# Patient Record
Sex: MALE | Race: WHITE
[De-identification: names, ages, dates, MRNs, and addresses within clinical notes are randomized per-mention and may not be internally consistent; named-entity substitution may affect disease eponyms.]

---

## 2017-07-21 ENCOUNTER — HOSPITAL ENCOUNTER (EMERGENCY)
Dept: HOSPITAL 17 - NEPE | Age: 72
Discharge: HOME | End: 2017-07-21
Payer: MEDICARE

## 2017-07-21 VITALS
SYSTOLIC BLOOD PRESSURE: 137 MMHG | OXYGEN SATURATION: 97 % | DIASTOLIC BLOOD PRESSURE: 85 MMHG | TEMPERATURE: 97.6 F | RESPIRATION RATE: 18 BRPM | HEART RATE: 81 BPM

## 2017-07-21 VITALS — HEIGHT: 70 IN | WEIGHT: 249.12 LBS | BODY MASS INDEX: 35.66 KG/M2

## 2017-07-21 VITALS — OXYGEN SATURATION: 97 %

## 2017-07-21 DIAGNOSIS — K21.9: ICD-10-CM

## 2017-07-21 DIAGNOSIS — K80.20: ICD-10-CM

## 2017-07-21 DIAGNOSIS — E78.00: ICD-10-CM

## 2017-07-21 DIAGNOSIS — M10.9: ICD-10-CM

## 2017-07-21 DIAGNOSIS — I25.2: ICD-10-CM

## 2017-07-21 DIAGNOSIS — R11.0: ICD-10-CM

## 2017-07-21 DIAGNOSIS — I11.0: ICD-10-CM

## 2017-07-21 DIAGNOSIS — I48.91: ICD-10-CM

## 2017-07-21 DIAGNOSIS — I49.3: ICD-10-CM

## 2017-07-21 DIAGNOSIS — I50.9: ICD-10-CM

## 2017-07-21 DIAGNOSIS — R10.30: Primary | ICD-10-CM

## 2017-07-21 LAB
ALP SERPL-CCNC: 95 U/L (ref 45–117)
ALT SERPL-CCNC: 27 U/L (ref 12–78)
ANION GAP SERPL CALC-SCNC: 9 MEQ/L (ref 5–15)
AST SERPL-CCNC: 21 U/L (ref 15–37)
BASOPHILS # BLD AUTO: 0 TH/MM3 (ref 0–0.2)
BASOPHILS NFR BLD: 0.7 % (ref 0–2)
BILIRUB SERPL-MCNC: 0.8 MG/DL (ref 0.2–1)
BUN SERPL-MCNC: 9 MG/DL (ref 7–18)
CHLORIDE SERPL-SCNC: 100 MEQ/L (ref 98–107)
COLOR UR: YELLOW
COMMENT (UR): (no result)
CULTURE IF INDICATED: (no result)
EOSINOPHIL # BLD: 0.2 TH/MM3 (ref 0–0.4)
EOSINOPHIL NFR BLD: 3.2 % (ref 0–4)
ERYTHROCYTE [DISTWIDTH] IN BLOOD BY AUTOMATED COUNT: 14.3 % (ref 11.6–17.2)
GFR SERPLBLD BASED ON 1.73 SQ M-ARVRAT: 69 ML/MIN (ref 89–?)
GLUCOSE UR STRIP-MCNC: (no result) MG/DL
HCO3 BLD-SCNC: 27.1 MEQ/L (ref 21–32)
HCT VFR BLD CALC: 37.3 % (ref 39–51)
HEMO FLAGS: (no result)
HGB UR QL STRIP: (no result)
KETONES UR STRIP-MCNC: (no result) MG/DL
LYMPHOCYTES # BLD AUTO: 1.7 TH/MM3 (ref 1–4.8)
LYMPHOCYTES NFR BLD AUTO: 26.9 % (ref 9–44)
MCH RBC QN AUTO: 28.9 PG (ref 27–34)
MCHC RBC AUTO-ENTMCNC: 33.5 % (ref 32–36)
MCV RBC AUTO: 86.4 FL (ref 80–100)
MONOCYTES NFR BLD: 12.4 % (ref 0–8)
MUCOUS THREADS #/AREA URNS LPF: (no result) /LPF
NEUTROPHILS # BLD AUTO: 3.5 TH/MM3 (ref 1.8–7.7)
NEUTROPHILS NFR BLD AUTO: 56.8 % (ref 16–70)
NITRITE UR QL STRIP: (no result)
PLATELET # BLD: 151 TH/MM3 (ref 150–450)
POTASSIUM SERPL-SCNC: 3.3 MEQ/L (ref 3.5–5.1)
RBC # BLD AUTO: 4.32 MIL/MM3 (ref 4.5–5.9)
SODIUM SERPL-SCNC: 136 MEQ/L (ref 136–145)
SP GR UR STRIP: 1.01 (ref 1–1.03)
WBC # BLD AUTO: 6.2 TH/MM3 (ref 4–11)

## 2017-07-21 PROCEDURE — 99285 EMERGENCY DEPT VISIT HI MDM: CPT

## 2017-07-21 PROCEDURE — 85025 COMPLETE CBC W/AUTO DIFF WBC: CPT

## 2017-07-21 PROCEDURE — 93005 ELECTROCARDIOGRAM TRACING: CPT

## 2017-07-21 PROCEDURE — 74177 CT ABD & PELVIS W/CONTRAST: CPT

## 2017-07-21 PROCEDURE — 83690 ASSAY OF LIPASE: CPT

## 2017-07-21 PROCEDURE — 96374 THER/PROPH/DIAG INJ IV PUSH: CPT

## 2017-07-21 PROCEDURE — 96375 TX/PRO/DX INJ NEW DRUG ADDON: CPT

## 2017-07-21 PROCEDURE — 80053 COMPREHEN METABOLIC PANEL: CPT

## 2017-07-21 PROCEDURE — 81001 URINALYSIS AUTO W/SCOPE: CPT

## 2017-07-21 NOTE — PD
HPI


.


Nausea and abdominal pain


Chief Complaint:  Abdominal Pain


Time Seen by Provider:  11:45


Travel History


International Travel<30 days:  No


Contact w/Intl Traveler<30days:  No


Traveled to known affect area:  No





History of Present Illness


HPI


This patient presents with the acute onset of lower abdominal pain and nausea.  

Onset was today.  He is not vomiting.  He has had no known fever.  No diarrhea.

  No urinary symptoms.  He rates the pain 67/10.  No exacerbating or relieving 

factors.





The patient is also complaining of pain "across his shoulders."





PFSH


Past Medical History


Arthritis:  Yes


Asthma:  No


Atrial Fibrillation:  Yes


Blood Disorders:  No


Anxiety:  No


Depression:  Yes


Heart Rhythm Problems:  No


Cancer:  No


Cardiovascular Problems:  Yes (A-FIB / CHF / MI)


High Cholesterol:  Yes


Chemotherapy:  No


Chest Pain:  Yes


Congestive Heart Failure:  Yes


Cirrhosis:  Yes (PT DOES NOT KNOW WHETHER HE ACTUALLY HAS THIS)


COPD:  No


Cerebrovascular Accident:  Yes (25 years ago)


Coronary Artery Disease:  Yes


Diabetes:  No


Diminished Hearing:  No


Diverticulitis:  Yes


Endocrine:  Yes


Gastrointestinal Disorders:  Yes (Diverticulitis)


GERD:  Yes


Gout:  Yes


Genitourinary:  No


Headaches:  No


Hepatitis:  No


Hiatal Hernia:  No


Herniated Disk:  Yes


Hypertension:  Yes


Immune Disorder:  No


Implanted Vascular Access Dvce:  No


Kidney Stones:  No


Musculoskeletal:  Yes


Neurologic:  Yes (CVA)


Psychiatric:  Yes


Reproductive:  Yes (prostate issues -retention)


Respiratory:  Yes


Immunizations Current:  Yes


Migraines:  No


Myocardial Infarction:  Yes


Pancreatitis:  Yes


Radiation Therapy:  No


Renal Failure:  No


Seizures:  No


Sickle Cell Disease:  No


Sleep Apnea:  No


Thyroid Disease:  Yes


Ulcer:  No


Tetanus Vaccination:  < 5 Years


Influenza Vaccination:  Yes


PNEUMOCCOCAL Vaccine (Year):  2011





Past Surgical History


Abdominal Surgery:  Yes (RUPTURED SPLEEN)


Cardiac Surgery:  No


Ear Surgery:  No


Endocrine Surgery:  No


Eye Surgery:  No


Genitourinary Surgery:  No


Gynecologic Surgery:  No


Neurologic Surgery:  No


Oral Surgery:  No


Thoracic Surgery:  No


Other Surgery:  Yes (ruptured spleen )





Social History


Alcohol Use:  No


Tobacco Use:  No


Substance Use:  No





Allergies-Medications


(Allergen,Severity, Reaction):  


Coded Allergies:  


     Allopurinol (Verified  Allergy, Severe, 10/6/16)


     Naprosyn (Verified  Allergy, Severe, Itching, 10/6/16)


Reported Meds & Prescriptions





Reported Meds & Active Scripts


Active


Zofran Odt (Ondansetron Odt) 4 Mg Tab 4 Mg SL Q6HR PRN


Aspirin Low Strength (Aspirin) 81 Mg Chw 81 Mg PO DAILY 30 Days


Eliquis (Apixaban) 5 Mg Tab 5 Mg PO BID 30 Days


Lipitor 40 Mg Tab (Atorvastatin Calcium) 40 Mg Tab 40 Mg PO DAILY


Aspirin 325 Mg Tab (Aspirin) 325 Mg Tab 325 Mg PO DAILY


Zofran Tab (Ondansetron HCl) 4 Mg Tab 4 Mg PO Q6 PRN


Furosemide 40 Mg Tab 20 Mg PO DAILY


Reported


Stool Softener (Miscellaneous Medication) 100 Mg Tab 100 Mg PO DAILY PRN


Tamsulosin 0.4 mg (Tamsulosin HCl) 0.4 Mg Cap 0.4 Mg PO DAILY


Vitamin D-3 (Cholecalciferol) 2 000 Tab 2,000 Unit PO DAILY


Magnesium (Magnesium Oxide) 400 Mg Tab 800 Mg PO DAILY


Gabapentin 100 Mg Cap 300 Mg PO HS


Omeprazole 20 mg (Omeprazole) 20 Mg Tab 20 Mg PO DAILY


Multi-Vitamin Daily (Multivitamins) Daily Tab 1 Tab PO DAILY


Diovan 160 mg (Valsartan) 160 Mg Tab 160 Mg PO DAILY


Synthroid (Levothyroxine Sodium) 25 Mcg Tab 25 Mcg PO DAILY








Review of Systems


Except as stated in HPI:  all other systems reviewed are Neg


General / Constitutional:  No: Fever, Chills


Cardiovascular:  No: Chest Pain or Discomfort


Respiratory:  No: Shortness of Breath


Gastrointestinal:  Positive: Nausea, Abdominal Pain,  No: Vomiting, Diarrhea, 

Constipation


Genitourinary:  No: Urgency, Frequency, Dysuria


Musculoskeletal:  Positive: Pain (across his shoulders)





Physical Exam


Narrative


GENERAL: Patient is awake and alert and does not appear to be in any acute 

distress.


SKIN: Warm and dry.


HEAD: Atraumatic. Normocephalic. 


EYES: Pupils equal and round.  Extraocular movements are intact.


ENT: No nasal bleeding or discharge.  Mucous membranes pink and moist.


NECK: Trachea midline.  Neck is supple.


CARDIOVASCULAR: Irregularly irregular rate and rhythm.


RESPIRATORY: No accessory muscle use.  Lungs are clear.


GASTROINTESTINAL: Abdomen soft, non-tender, nondistended.  I am unable to 

elicit any tenderness.


MUSCULOSKELETAL: No obvious deformities.   No edema. 


NEUROLOGICAL: Awake and alert. No obvious cranial nerve deficits.  Motor 

grossly within normal limits. Normal speech.


PSYCHIATRIC: Appropriate mood and affect; insight and judgment normal.





Data


Data


Last Documented VS





Vital Signs








  Date Time  Temp Pulse Resp B/P Pulse Ox O2 Delivery O2 Flow Rate FiO2


 


7/21/17 11:50     97 Room Air  


 


7/21/17 11:43 97.6 81 18 137/85    








Orders





 Complete Blood Count With Diff (7/21/17 11:45)


Comprehensive Metabolic Panel (7/21/17 11:45)


Lipase (7/21/17 11:45)


Urinalysis - C+S If Indicated (7/21/17 11:45)


Ct Abd/Pel W Iv Contrast(Rout) (7/21/17 11:45)


Iv Access Insert/Monitor (7/21/17 11:45)


Ecg Monitoring (7/21/17 11:45)


Oximetry (7/21/17 11:45)


Morphine Inj (Morphine Inj) (7/21/17 11:45)


Ondansetron Inj (Zofran Inj) (7/21/17 11:45)


Sodium Chloride 0.9% Flush (Ns Flush) (7/21/17 11:45)


Electrocardiogram (7/21/17 11:45)


Iohexol 350 Inj (Omnipaque 350 Inj) (7/21/17 13:08)





Labs





 Laboratory Tests








Test 7/21/17 7/21/17





 11:50 13:20


 


White Blood Count 6.2 TH/MM3 


 


Red Blood Count 4.32 MIL/MM3 


 


Hemoglobin 12.5 GM/DL 


 


Hematocrit 37.3 % 


 


Mean Corpuscular Volume 86.4 FL 


 


Mean Corpuscular Hemoglobin 28.9 PG 


 


Mean Corpuscular Hemoglobin 33.5 % 





Concent  


 


Red Cell Distribution Width 14.3 % 


 


Platelet Count 151 TH/MM3 


 


Mean Platelet Volume 8.7 FL 


 


Neutrophils (%) (Auto) 56.8 % 


 


Lymphocytes (%) (Auto) 26.9 % 


 


Monocytes (%) (Auto) 12.4 % 


 


Eosinophils (%) (Auto) 3.2 % 


 


Basophils (%) (Auto) 0.7 % 


 


Neutrophils # (Auto) 3.5 TH/MM3 


 


Lymphocytes # (Auto) 1.7 TH/MM3 


 


Monocytes # (Auto) 0.8 TH/MM3 


 


Eosinophils # (Auto) 0.2 TH/MM3 


 


Basophils # (Auto) 0.0 TH/MM3 


 


CBC Comment DIFF FINAL  


 


Differential Comment   


 


Sodium Level 136 MEQ/L 


 


Potassium Level 3.3 MEQ/L 


 


Chloride Level 100 MEQ/L 


 


Carbon Dioxide Level 27.1 MEQ/L 


 


Anion Gap 9 MEQ/L 


 


Blood Urea Nitrogen 9 MG/DL 


 


Creatinine 1.05 MG/DL 


 


Estimat Glomerular Filtration 69 ML/MIN 





Rate  


 


Random Glucose 120 MG/DL 


 


Calcium Level 8.7 MG/DL 


 


Total Bilirubin 0.8 MG/DL 


 


Aspartate Amino Transf 21 U/L 





(AST/SGOT)  


 


Alanine Aminotransferase 27 U/L 





(ALT/SGPT)  


 


Alkaline Phosphatase 95 U/L 


 


Total Protein 8.1 GM/DL 


 


Albumin 3.7 GM/DL 


 


Lipase 99 U/L 


 


Urine Color  YELLOW 


 


Urine Turbidity  CLEAR 


 


Urine pH  5.5 


 


Urine Specific Gravity  1.015 


 


Urine Protein  NEG mg/dL


 


Urine Glucose (UA)  NEG mg/dL


 


Urine Ketones  NEG mg/dL


 


Urine Occult Blood  NEG 


 


Urine Nitrite  NEG 


 


Urine Bilirubin  NEG 


 


Urine Urobilinogen  LESS THAN 2.0





  MG/DL


 


Urine Leukocyte Esterase  NEG 


 


Urine WBC  LESS THAN 1





  /hpf


 


Urine Mucus  FEW /lpf


 


Microscopic Urinalysis Comment  CULT NOT





  INDICATED











MDM


Medical Decision Making


Medical Screen Exam Complete:  Yes


Emergency Medical Condition:  Yes


Medical Record Reviewed:  Yes (past medical history is significant for 

diverticulitis, hypertension, atrial fibrillation, CHF, hypothyroidism, 

previous stroke, previous alcohol abuse and previous suicidal ideation.)


Interpretation(s)


Atrial fibrillation with frequent PVCs.  Ventricular rate is 72.  There is no 

acute ST segment elevation or depression.


Differential Diagnosis


Differential diagnosis of abdominal pain includes but is not limited to 

gastritis, pancreatitis, hepatitis, gastroenteritis, gallbladder disease, 

constipation, urinary retention, UTI, peptic ulcer disease, diverticulitis or 

appendicitis


Narrative Course


This patient presents with nausea and lower abdominal pain.  His exam is benign.





CBC & BMP Diagram


7/21/17 11:50








LFTs and lipase are normal.





CT abd/pelvis:


1. No acute abnormality to explain the patient's pain.


2. Cholelithiasis.


3. Prior granulomatous disease.





UA is negative.





The history, exam, diagnostic testing, and current condition do not suggest any 

significant pathology to warrant further testing, continued ED treatment, 

admission, or surgical evaluation at this point.  The patient's condition is 

stable and appropriate for discharge.





Diagnosis





 Primary Impression:  


 Abdominal pain


 Qualified Code:  R10.30 - Lower abdominal pain


 Additional Impression:  


 Nausea


Patient Instructions:  Abdominal Pain (ED), General Instructions


Scripts


Ondansetron Odt (Zofran Odt)4 Mg Tab4 Mg SL Q6HR PRN (Nausea/Vomiting) #30 TAB  

Ref 0


   Prov:Renay Solomon MD         7/21/17


Disposition:  01 DISCHARGE HOME


Condition:  Stable








Renay Solomon MD Jul 21, 2017 12:46

## 2017-07-21 NOTE — EKG
Date Performed: 07/21/2017       Time Performed: 11:46:12

 

PTAGE:      72 years

 

EKG:      ATRIAL FIBRILLATION WITH ABERRANT CONDUCTION OR VENTRICULAR PREMATURE COMPLEXES LOW QRS VOL
TAGE IN PRECORDIAL LEADS ABNORMAL RHYTHM ECG

 

PREVIOUS TRACING       : 10/07/2016 05.26 Compared to the previous tracing PVCs are new

 

DOCTOR:   Eddie Franco  Interpretating Date/Time  07/21/2017 14:44:34

## 2017-07-21 NOTE — RADRPT
EXAM DATE/TIME:  07/21/2017 12:57 

 

[facility] COMPARISON:     

CT ABDOMEN & PELVIS W CONTRAST, July 09, 2016, 9:46.

 

 

INDICATIONS :     

Nausea, with Lower abdominal pain.

                     

 

IV CONTRAST:     

92 cc Omnipaque 350 (iohexol) IV 

 

 

ORAL CONTRAST:      

No oral contrast ingested.

                     

 

RADIATION DOSE:     

14.34 CTDIvol (mGy) 

 

 

MEDICAL HISTORY :     

Cardiovascular disease. Cerebrovascular disease.  Hypertension.CHF,CVA, ruptured spleen, prostate ret
ention.

 

SURGICAL HISTORY :       

 

ENCOUNTER:      

Initial

 

ACUITY:      

1 day

 

PAIN SCALE:      

5/10

 

LOCATION:        

lower quadrant abdominal.

 

TECHNIQUE:     

Volumetric scanning of the abdomen and pelvis was performed.  Using automated exposure control and ad
justment of the mA and/or kV according to patient size, radiation dose was kept as low as reasonably 
achievable to obtain optimal diagnostic quality images.  DICOM format image data is available electro
nically for review and comparison.  

 

FINDINGS:     

 

LOWER LUNGS:     

The visualized lower lungs are clear.

 

LIVER:     

Homogeneous density without lesion.  There is no dilation of the biliary tree. Several small calcifie
d gallstones are seen layering within an otherwise normal appearing gallbladder.

 

SPLEEN:     

Normal size without lesion. Multiple small granulomatous calcifications are seen involving the spleen


 

PANCREAS:     

Within normal limits.

 

KIDNEYS:     

Normal in size and shape.  There is no mass, stone or hydronephrosis.

 

ADRENAL GLANDS:     

Within normal limits.

 

VASCULAR:     

There is no aortic aneurysm.

 

BOWEL/MESENTERY:     

The stomach, small bowel, and colon demonstrate no acute abnormality.  There is no free intraperitone
al air or fluid. Multiple colonic diverticuli without acute inflammation. Appendix is normal by CT cr
iteria.

 

ABDOMINAL WALL:     

Within normal limits.

 

RETROPERITONEUM:     

There is no lymphadenopathy. 

 

BLADDER:     

No wall thickening or mass. 

 

REPRODUCTIVE:     

Within normal limits.

 

INGUINAL:     

There is no lymphadenopathy or hernia. 

 

MUSCULOSKELETAL:     

Within normal limits for patient age. 

 

CONCLUSION:     

1. No acute abnormality to explain the patient's pain.

2. Cholelithiasis.

3. Prior granulomatous disease.

 

 

 

 Cortez Luna Jr., MD on July 21, 2017 at 13:17           

Board Certified Radiologist.

 This report was verified electronically.

## 2018-06-12 ENCOUNTER — HOSPITAL ENCOUNTER (OUTPATIENT)
Dept: HOSPITAL 17 - NEPD | Age: 73
Setting detail: OBSERVATION
LOS: 1 days | Discharge: HOME | End: 2018-06-13
Attending: HOSPITALIST | Admitting: HOSPITALIST
Payer: OTHER GOVERNMENT

## 2018-06-12 VITALS
SYSTOLIC BLOOD PRESSURE: 146 MMHG | DIASTOLIC BLOOD PRESSURE: 76 MMHG | OXYGEN SATURATION: 96 % | TEMPERATURE: 98.3 F | RESPIRATION RATE: 20 BRPM | HEART RATE: 80 BPM

## 2018-06-12 VITALS — BODY MASS INDEX: 33.41 KG/M2 | HEIGHT: 68 IN | WEIGHT: 220.46 LBS

## 2018-06-12 VITALS
TEMPERATURE: 98.2 F | SYSTOLIC BLOOD PRESSURE: 117 MMHG | HEART RATE: 73 BPM | RESPIRATION RATE: 17 BRPM | DIASTOLIC BLOOD PRESSURE: 59 MMHG | OXYGEN SATURATION: 98 %

## 2018-06-12 VITALS
SYSTOLIC BLOOD PRESSURE: 146 MMHG | TEMPERATURE: 98.3 F | OXYGEN SATURATION: 96 % | HEART RATE: 80 BPM | RESPIRATION RATE: 20 BRPM | DIASTOLIC BLOOD PRESSURE: 76 MMHG

## 2018-06-12 VITALS
DIASTOLIC BLOOD PRESSURE: 68 MMHG | OXYGEN SATURATION: 96 % | SYSTOLIC BLOOD PRESSURE: 159 MMHG | TEMPERATURE: 98 F | HEART RATE: 92 BPM | RESPIRATION RATE: 19 BRPM

## 2018-06-12 VITALS — OXYGEN SATURATION: 96 %

## 2018-06-12 DIAGNOSIS — Z86.73: ICD-10-CM

## 2018-06-12 DIAGNOSIS — F10.129: ICD-10-CM

## 2018-06-12 DIAGNOSIS — K74.60: ICD-10-CM

## 2018-06-12 DIAGNOSIS — M19.90: ICD-10-CM

## 2018-06-12 DIAGNOSIS — K21.9: ICD-10-CM

## 2018-06-12 DIAGNOSIS — K57.92: ICD-10-CM

## 2018-06-12 DIAGNOSIS — Z79.01: ICD-10-CM

## 2018-06-12 DIAGNOSIS — I25.10: ICD-10-CM

## 2018-06-12 DIAGNOSIS — E78.00: ICD-10-CM

## 2018-06-12 DIAGNOSIS — M10.9: ICD-10-CM

## 2018-06-12 DIAGNOSIS — E78.5: ICD-10-CM

## 2018-06-12 DIAGNOSIS — R07.89: ICD-10-CM

## 2018-06-12 DIAGNOSIS — I11.0: ICD-10-CM

## 2018-06-12 DIAGNOSIS — F41.9: ICD-10-CM
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
## 2018-06-12 DIAGNOSIS — E07.9: ICD-10-CM

## 2018-06-12 DIAGNOSIS — R06.02: ICD-10-CM

## 2018-06-12 DIAGNOSIS — K86.1: ICD-10-CM

## 2018-06-12 DIAGNOSIS — I50.9: ICD-10-CM

## 2018-06-12 DIAGNOSIS — R07.9: Primary | ICD-10-CM

## 2018-06-12 DIAGNOSIS — I25.2: ICD-10-CM

## 2018-06-12 DIAGNOSIS — I48.91: ICD-10-CM

## 2018-06-12 DIAGNOSIS — R00.2: ICD-10-CM

## 2018-06-12 DIAGNOSIS — R94.31: ICD-10-CM

## 2018-06-12 DIAGNOSIS — N40.0: ICD-10-CM

## 2018-06-12 LAB
ALBUMIN SERPL-MCNC: 4 GM/DL (ref 3.4–5)
ALP SERPL-CCNC: 78 U/L (ref 45–117)
ALT SERPL-CCNC: 34 U/L (ref 12–78)
APAP SERPL-MCNC: (no result) MCG/ML (ref 10–30)
AST SERPL-CCNC: 33 U/L (ref 15–37)
BASOPHILS # BLD AUTO: 0.1 TH/MM3 (ref 0–0.2)
BASOPHILS NFR BLD: 1 % (ref 0–2)
BILIRUB SERPL-MCNC: 0.7 MG/DL (ref 0.2–1)
BUN SERPL-MCNC: 12 MG/DL (ref 7–18)
CALCIUM SERPL-MCNC: 8.7 MG/DL (ref 8.5–10.1)
CHLORIDE SERPL-SCNC: 105 MEQ/L (ref 98–107)
COLOR UR: YELLOW
CREAT SERPL-MCNC: 1.11 MG/DL (ref 0.6–1.3)
EOSINOPHIL # BLD: 0.1 TH/MM3 (ref 0–0.4)
EOSINOPHIL NFR BLD: 2.2 % (ref 0–4)
ERYTHROCYTE [DISTWIDTH] IN BLOOD BY AUTOMATED COUNT: 13.9 % (ref 11.6–17.2)
GFR SERPLBLD BASED ON 1.73 SQ M-ARVRAT: 65 ML/MIN (ref 89–?)
GLUCOSE SERPL-MCNC: 84 MG/DL (ref 74–106)
GLUCOSE UR STRIP-MCNC: (no result) MG/DL
HCO3 BLD-SCNC: 21.8 MEQ/L (ref 21–32)
HCT VFR BLD CALC: 41.7 % (ref 39–51)
HGB BLD-MCNC: 14.4 GM/DL (ref 13–17)
HGB UR QL STRIP: (no result)
HYALINE CASTS #/AREA URNS LPF: 2 /LPF
INR PPP: 2.3 RATIO
KETONES UR STRIP-MCNC: 10 MG/DL
LYMPHOCYTES # BLD AUTO: 2.3 TH/MM3 (ref 1–4.8)
LYMPHOCYTES NFR BLD AUTO: 37 % (ref 9–44)
MAGNESIUM SERPL-MCNC: 2 MG/DL (ref 1.5–2.5)
MCH RBC QN AUTO: 30.9 PG (ref 27–34)
MCHC RBC AUTO-ENTMCNC: 34.6 % (ref 32–36)
MCV RBC AUTO: 89.3 FL (ref 80–100)
MONOCYTE #: 0.4 TH/MM3 (ref 0–0.9)
MONOCYTES NFR BLD: 6 % (ref 0–8)
MUCOUS THREADS #/AREA URNS LPF: (no result) /LPF
NEUTROPHILS # BLD AUTO: 3.4 TH/MM3 (ref 1.8–7.7)
NEUTROPHILS NFR BLD AUTO: 53.8 % (ref 16–70)
NITRITE UR QL STRIP: (no result)
PLATELET # BLD: 185 TH/MM3 (ref 150–450)
PMV BLD AUTO: 8.8 FL (ref 7–11)
PROT SERPL-MCNC: 8.3 GM/DL (ref 6.4–8.2)
PROTHROMBIN TIME: 22.8 SEC (ref 9.8–11.6)
RBC # BLD AUTO: 4.67 MIL/MM3 (ref 4.5–5.9)
SODIUM SERPL-SCNC: 141 MEQ/L (ref 136–145)
SP GR UR STRIP: 1.02 (ref 1–1.03)
SQUAMOUS #/AREA URNS HPF: <1 /HPF (ref 0–5)
T4 FREE SERPL-MCNC: 1.19 NG/DL (ref 0.76–1.46)
TROPONIN I SERPL-MCNC: 0.04 NG/ML (ref 0.02–0.05)
TROPONIN I SERPL-MCNC: 0.04 NG/ML (ref 0.02–0.05)
URINE LEUKOCYTE ESTERASE: (no result)
WBC # BLD AUTO: 6.3 TH/MM3 (ref 4–11)

## 2018-06-12 PROCEDURE — 80053 COMPREHEN METABOLIC PANEL: CPT

## 2018-06-12 PROCEDURE — 85730 THROMBOPLASTIN TIME PARTIAL: CPT

## 2018-06-12 PROCEDURE — 80061 LIPID PANEL: CPT

## 2018-06-12 PROCEDURE — 82552 ASSAY OF CPK IN BLOOD: CPT

## 2018-06-12 PROCEDURE — 84484 ASSAY OF TROPONIN QUANT: CPT

## 2018-06-12 PROCEDURE — 93005 ELECTROCARDIOGRAM TRACING: CPT

## 2018-06-12 PROCEDURE — 85610 PROTHROMBIN TIME: CPT

## 2018-06-12 PROCEDURE — 84100 ASSAY OF PHOSPHORUS: CPT

## 2018-06-12 PROCEDURE — 83690 ASSAY OF LIPASE: CPT

## 2018-06-12 PROCEDURE — 83036 HEMOGLOBIN GLYCOSYLATED A1C: CPT

## 2018-06-12 PROCEDURE — 81001 URINALYSIS AUTO W/SCOPE: CPT

## 2018-06-12 PROCEDURE — 85025 COMPLETE CBC W/AUTO DIFF WBC: CPT

## 2018-06-12 PROCEDURE — G0378 HOSPITAL OBSERVATION PER HR: HCPCS

## 2018-06-12 PROCEDURE — 83735 ASSAY OF MAGNESIUM: CPT

## 2018-06-12 PROCEDURE — 82948 REAGENT STRIP/BLOOD GLUCOSE: CPT

## 2018-06-12 PROCEDURE — 84443 ASSAY THYROID STIM HORMONE: CPT

## 2018-06-12 PROCEDURE — 97166 OT EVAL MOD COMPLEX 45 MIN: CPT

## 2018-06-12 PROCEDURE — 71045 X-RAY EXAM CHEST 1 VIEW: CPT

## 2018-06-12 PROCEDURE — 97161 PT EVAL LOW COMPLEX 20 MIN: CPT

## 2018-06-12 PROCEDURE — 82550 ASSAY OF CK (CPK): CPT

## 2018-06-12 PROCEDURE — 84439 ASSAY OF FREE THYROXINE: CPT

## 2018-06-12 PROCEDURE — 99285 EMERGENCY DEPT VISIT HI MDM: CPT

## 2018-06-12 PROCEDURE — 82306 VITAMIN D 25 HYDROXY: CPT

## 2018-06-12 PROCEDURE — 80307 DRUG TEST PRSMV CHEM ANLYZR: CPT

## 2018-06-12 RX ADMIN — GABAPENTIN SCH MG: 400 CAPSULE ORAL at 19:51

## 2018-06-12 RX ADMIN — ISOSORBIDE MONONITRATE SCH MG: 30 TABLET, EXTENDED RELEASE ORAL at 19:50

## 2018-06-12 RX ADMIN — Medication SCH ML: at 21:06

## 2018-06-12 RX ADMIN — FAMOTIDINE SCH MG: 20 TABLET, FILM COATED ORAL at 21:05

## 2018-06-12 RX ADMIN — Medication SCH MG: at 19:51

## 2018-06-12 RX ADMIN — MULTIPLE VITAMINS W/ MINERALS TAB SCH TAB: TAB at 19:50

## 2018-06-12 RX ADMIN — HEPARIN SODIUM SCH UNITS: 10000 INJECTION, SOLUTION INTRAVENOUS; SUBCUTANEOUS at 21:10

## 2018-06-12 RX ADMIN — CARVEDILOL SCH MG: 6.25 TABLET, FILM COATED ORAL at 21:05

## 2018-06-12 RX ADMIN — STANDARDIZED SENNA CONCENTRATE AND DOCUSATE SODIUM SCH TAB: 8.6; 5 TABLET, FILM COATED ORAL at 21:06

## 2018-06-12 RX ADMIN — FOLIC ACID SCH MG: 1 TABLET ORAL at 19:51

## 2018-06-12 RX ADMIN — FUROSEMIDE SCH MG: 40 TABLET ORAL at 21:06

## 2018-06-12 RX ADMIN — OXYCODONE HYDROCHLORIDE AND ACETAMINOPHEN PRN TAB: 10; 325 TABLET ORAL at 21:07

## 2018-06-12 NOTE — PD
HPI


Chief Complaint:  Psychiatric Symptoms


Time Seen by Provider:  13:28


Travel History


International Travel<30 days:  No


Contact w/Intl Traveler<30days:  No


Traveled to known affect area:  No





History of Present Illness


HPI


Patient is a 73-year-old male presenting to the emergency department under 

Baker act [facility].  Per the Baker act 

patient is at risk for potential self-harm, he has been laughing inappropriately

, anxious, paranoid.  Patient stated [facility] 

to [facility] and he does not know why  he was brought here.  He denies any 

physical complaints, he denies any suicidal homicidal ideations.  Patient 

states he is 27 years old but then stated that he lies about his age so much he 

forgets how old he really is.  Patient denies any pain.  Patient did state that 

he was going to die, he does not elaborate on the thought.





PFSH


Past Medical History


Hx Anticoagulant Therapy:  No


Arthritis:  Yes


Atrial Fibrillation:  Yes


Anxiety:  Yes


Depression:  Yes


High Cholesterol:  Yes


Chest Pain:  Yes


Congestive Heart Failure:  Yes


Cirrhosis:  Yes


Cerebrovascular Accident:  Yes


Coronary Artery Disease:  Yes


Diverticulitis:  Yes


GERD:  Yes


Gout:  Yes


Herniated Disk:  Yes


Hypertension:  Yes


Reproductive:  Yes (BPH)


Immunizations Current:  Yes


Myocardial Infarction:  Yes


Pancreatitis:  Yes


Thyroid Disease:  Yes


Tetanus Vaccination:  Unknown


Influenza Vaccination:  No


PNEUMOCCOCAL Vaccine (Year):  2011


Pregnant?:  Not Pregnant





Past Surgical History


Abdominal Surgery:  Yes (RUPTURED SPLEEN)





Social History


Alcohol Use:  Yes (Occasional)


Tobacco Use:  No


Substance Use:  No





Allergies-Medications


(Allergen,Severity, Reaction):  


Coded Allergies:  


     allopurinol (Unverified  Allergy, Severe, 8/15/17)


     naproxen (Unverified  Allergy, Severe, Itching, 8/15/17)


Reported Meds & Prescriptions





Reported Meds & Active Scripts


Active


Reported


Aspirin 325 Mg Tab 325 Mg PO DAILY


Omeprazole 20 Mg Tab 20 Mg PO DAILY


Nitroglycerin SL (Nitroglycerin) 0.4 Mg Subl 0.4 Mg SL AS DIRECTED PRN


     ONE TABLET UNDER THE TONGUE AS NEEDED FOR CHEST PAIN, MAY REPEAT EVERY


     FIVE MINUTES FOR A TOTAL OF 3 DOSES OR CALL 911 IF NO RELIEF


Lidocaine Topical (Lidocaine) 4 % Cream 1 Applic TOPICAL QID


     Apply to affected area on skin


Levothyroxine (Levothyroxine Sodium) 25 Mcg Tab 25 Mcg PO DAILY


Isosorbide Mononitrate ER (Isosorbide Mononitrate) 30 Mg Kal 30 Mg PO DAILY


Neurontin (Gabapentin) 400 Mg Cap 400 Mg PO TID


Lasix (Furosemide) 40 Mg Tab 40 Mg PO BID


Coreg (Carvedilol) 6.25 Mg Tab 3.125 Mg PO BID


Lipitor (Atorvastatin Calcium) 80 Mg Tab 80 Mg PO HS


Norvasc (Amlodipine Besylate) 2.5 Mg Tab 2.5 Mg PO DAILY


Alfuzosin HCl ER (Alfuzosin HCl) 10 Mg Tab 10 Mg PO DAILY








Review of Systems


Except as stated in HPI:  all other systems reviewed are Neg


Psychiatric:  No: Anxiety, Suicidal Ideations, Mood Disorder, Homicidal Ideation





Physical Exam


Narrative


GENERAL: Overweight, well-developed, alert elderly gentleman.  Presenting in no 

acute distress.


SKIN: Warm and dry.


HEAD: Atraumatic. Normocephalic. 


EYES: Pupils equal and round. No scleral icterus. No injection or drainage. 


ENT: No nasal bleeding or discharge.  Mucous membranes pink and moist.


NECK: Trachea midline. No JVD. 


CARDIOVASCULAR: Regular rate and rhythm.  


RESPIRATORY: No accessory muscle use. Clear to auscultation. Breath sounds 

equal bilaterally. 


GASTROINTESTINAL: Abdomen soft, non-tender, nondistended. Hepatic and splenic 

margins not palpable. 


MUSCULOSKELETAL: Extremities without clubbing, cyanosis, or edema. No obvious 

deformities. 


NEUROLOGICAL: Awake and alert oriented to self, place, time.. No obvious 

cranial nerve deficits.  Motor grossly within normal limits. Five out of 5 

muscle strength in the arms and legs.  Normal speech.


PSYCHIATRIC: Appropriate mood and affect; insight and judgment normal.





Data


Data


Last Documented VS





Vital Signs








  Date Time  Temp Pulse Resp B/P (MAP) Pulse Ox O2 Delivery O2 Flow Rate FiO2


 


6/12/18 13:44     96 Room Air  


 


6/12/18 13:07 98.3 80 20     








Orders





 Orders


Complete Blood Count With Diff (6/12/18 13:29)


Comprehensive Metabolic Panel (6/12/18 13:29)


Thyroid Stimulating Hormone (6/12/18 13:29)


Urinalysis - C+S If Indicated (6/12/18 13:29)


Oximetry (6/12/18 13:29)


Iv Access Insert/Monitor (6/12/18 13:29)


Ecg Monitoring (6/12/18 13:29)


Psych Screen (6/12/18 13:29)


Sodium Chloride 0.9% Flush (Ns Flush) (6/12/18 13:30)


Drug Screen, Random Urine (6/12/18 13:29)


Alcohol (Ethanol) (6/12/18 13:29)


Salicylates (Aspirin) (6/12/18 13:29)


Tylenol (Acetaminophen) (6/12/18 13:29)


Free Thyroxine (T4) (6/12/18 13:29)


Vitamin D, 25-Hydroxy (6/12/18 13:29)


Diet Heart Healthy (6/12/18 Dinner)


Alcohol Withdrawal Asmt-Ciwa ONCE (6/12/18 14:45)


Acetaminophen (Tylenol) (6/12/18 14:45)


Flumazenil Inj (Romazicon Inj) (6/12/18 14:45)


Lorazepam (Ativan) (6/12/18 14:45)


Lorazepam Inj (Ativan Inj) (6/12/18 14:45)


Lorazepam (Ativan) (6/12/18 14:45)


Lorazepam Inj (Ativan Inj) (6/12/18 14:45)


Ergocalciferol (Drisdol) (6/12/18 14:45)


Electrocardiogram (6/12/18 )


Electrocardiogram (6/12/18 15:52)


Ckmb (Isoenzyme) Profile (6/12/18 15:52)


Magnesium (Mg) (6/12/18 15:52)


Prothrombin Time / Inr (Pt) (6/12/18 15:52)


Act Partial Throm Time (Ptt) (6/12/18 15:52)


Troponin I (6/12/18 15:52)


Lipase (6/12/18 15:52)


CKMB (6/12/18 15:57)


CKMB% (6/12/18 15:57)


Admit Order (Ed Use Only) (6/12/18 17:42)





Labs





Laboratory Tests








Test


  6/12/18


13:42 6/12/18


15:57 6/12/18


16:30


 


White Blood Count 6.3 TH/MM3   


 


Red Blood Count 4.67 MIL/MM3   


 


Hemoglobin 14.4 GM/DL   


 


Hematocrit 41.7 %   


 


Mean Corpuscular Volume 89.3 FL   


 


Mean Corpuscular Hemoglobin 30.9 PG   


 


Mean Corpuscular Hemoglobin


Concent 34.6 % 


  


  


 


 


Red Cell Distribution Width 13.9 %   


 


Platelet Count 185 TH/MM3   


 


Mean Platelet Volume 8.8 FL   


 


Neutrophils (%) (Auto) 53.8 %   


 


Lymphocytes (%) (Auto) 37.0 %   


 


Monocytes (%) (Auto) 6.0 %   


 


Eosinophils (%) (Auto) 2.2 %   


 


Basophils (%) (Auto) 1.0 %   


 


Neutrophils # (Auto) 3.4 TH/MM3   


 


Lymphocytes # (Auto) 2.3 TH/MM3   


 


Monocytes # (Auto) 0.4 TH/MM3   


 


Eosinophils # (Auto) 0.1 TH/MM3   


 


Basophils # (Auto) 0.1 TH/MM3   


 


CBC Comment DIFF FINAL   


 


Differential Comment    


 


Blood Urea Nitrogen 12 MG/DL   


 


Creatinine 1.11 MG/DL   


 


Random Glucose 84 MG/DL   


 


Total Protein 8.3 GM/DL   


 


Albumin 4.0 GM/DL   


 


Calcium Level 8.7 MG/DL   


 


Alkaline Phosphatase 78 U/L   


 


Aspartate Amino Transf


(AST/SGOT) 33 U/L 


  


  


 


 


Alanine Aminotransferase


(ALT/SGPT) 34 U/L 


  


  


 


 


Total Bilirubin 0.7 MG/DL   


 


Sodium Level 141 MEQ/L   


 


Potassium Level 3.9 MEQ/L   


 


Chloride Level 105 MEQ/L   


 


Carbon Dioxide Level 21.8 MEQ/L   


 


Anion Gap 14 MEQ/L   


 


Estimat Glomerular Filtration


Rate 65 ML/MIN 


  


  


 


 


25-Hydroxy Vitamin D Total 26.3 ng/ML   


 


Free Thyroxine 1.19 NG/DL   


 


Thyroid Stimulating Hormone


3rd Gen 0.902 uIU/ML 


  


  


 


 


Salicylates Level


  LESS THAN 1.7


MG/DL 


  


 


 


Acetaminophen Level


  LESS THAN 2.0


MCG/ML 


  


 


 


Ethyl Alcohol Level 248 MG/DL   


 


Prothrombin Time  22.8 SEC  


 


Prothromb Time International


Ratio 


  2.3 RATIO 


  


 


 


Activated Partial


Thromboplast Time 


  51.5 SEC 


  


 


 


Magnesium Level  2.0 MG/DL  


 


Total Creatine Kinase  204 U/L  


 


Creatine Kinase MB  2.1 NG/ML  


 


Troponin I  0.04 NG/ML  


 


Lipase  93 U/L  


 


Urine Color   YELLOW 


 


Urine Turbidity   CLEAR 


 


Urine pH   5.5 


 


Urine Specific Gravity   1.020 


 


Urine Protein   TRACE mg/dL 


 


Urine Glucose (UA)   NEG mg/dL 


 


Urine Ketones   10 mg/dL 


 


Urine Occult Blood   NEG 


 


Urine Nitrite   NEG 


 


Urine Bilirubin   NEG 


 


Urine Urobilinogen


  


  


  LESS THAN 2.0


MG/DL


 


Urine Leukocyte Esterase   NEG 


 


Urine RBC


  


  


  LESS THAN 1


/hpf


 


Urine WBC


  


  


  LESS THAN 1


/hpf


 


Urine Squamous Epithelial


Cells 


  


  <1 /hpf 


 


 


Urine Hyaline Casts   2 /lpf 


 


Urine Mucus   FEW /lpf 


 


Microscopic Urinalysis Comment


  


  


  CULT NOT


INDICATED


 


Urine Opiates Screen   NEG 


 


Urine Barbiturates Screen   NEG 


 


Urine Amphetamines Screen   NEG 


 


Urine Benzodiazepines Screen   POS 


 


Urine Cocaine Screen   NEG 


 


Urine Cannabinoids Screen   NEG 











MDM


Medical Decision Making


Medical Screen Exam Complete:  Yes


Emergency Medical Condition:  Yes


Medical Record Reviewed:  Yes


Interpretation(s)





Laboratory Tests








Test


  6/12/18


13:42 6/12/18


15:57 6/12/18


16:30


 


White Blood Count 6.3 TH/MM3   


 


Red Blood Count 4.67 MIL/MM3   


 


Hemoglobin 14.4 GM/DL   


 


Hematocrit 41.7 %   


 


Mean Corpuscular Volume 89.3 FL   


 


Mean Corpuscular Hemoglobin 30.9 PG   


 


Mean Corpuscular Hemoglobin


Concent 34.6 % 


  


  


 


 


Red Cell Distribution Width 13.9 %   


 


Platelet Count 185 TH/MM3   


 


Mean Platelet Volume 8.8 FL   


 


Neutrophils (%) (Auto) 53.8 %   


 


Lymphocytes (%) (Auto) 37.0 %   


 


Monocytes (%) (Auto) 6.0 %   


 


Eosinophils (%) (Auto) 2.2 %   


 


Basophils (%) (Auto) 1.0 %   


 


Neutrophils # (Auto) 3.4 TH/MM3   


 


Lymphocytes # (Auto) 2.3 TH/MM3   


 


Monocytes # (Auto) 0.4 TH/MM3   


 


Eosinophils # (Auto) 0.1 TH/MM3   


 


Basophils # (Auto) 0.1 TH/MM3   


 


CBC Comment DIFF FINAL   


 


Differential Comment    


 


Blood Urea Nitrogen 12 MG/DL   


 


Creatinine 1.11 MG/DL   


 


Random Glucose 84 MG/DL   


 


Total Protein 8.3 GM/DL   


 


Albumin 4.0 GM/DL   


 


Calcium Level 8.7 MG/DL   


 


Alkaline Phosphatase 78 U/L   


 


Aspartate Amino Transf


(AST/SGOT) 33 U/L 


  


  


 


 


Alanine Aminotransferase


(ALT/SGPT) 34 U/L 


  


  


 


 


Total Bilirubin 0.7 MG/DL   


 


Sodium Level 141 MEQ/L   


 


Potassium Level 3.9 MEQ/L   


 


Chloride Level 105 MEQ/L   


 


Carbon Dioxide Level 21.8 MEQ/L   


 


Anion Gap 14 MEQ/L   


 


Estimat Glomerular Filtration


Rate 65 ML/MIN 


  


  


 


 


25-Hydroxy Vitamin D Total 26.3 ng/ML   


 


Free Thyroxine 1.19 NG/DL   


 


Thyroid Stimulating Hormone


3rd Gen 0.902 uIU/ML 


  


  


 


 


Salicylates Level


  LESS THAN 1.7


MG/DL 


  


 


 


Acetaminophen Level


  LESS THAN 2.0


MCG/ML 


  


 


 


Ethyl Alcohol Level 248 MG/DL   


 


Prothrombin Time  22.8 SEC  


 


Prothromb Time International


Ratio 


  2.3 RATIO 


  


 


 


Activated Partial


Thromboplast Time 


  51.5 SEC 


  


 


 


Magnesium Level  2.0 MG/DL  


 


Total Creatine Kinase  204 U/L  


 


Creatine Kinase MB  2.1 NG/ML  


 


Troponin I  0.04 NG/ML  


 


Lipase  93 U/L  


 


Urine Color   YELLOW 


 


Urine Turbidity   CLEAR 


 


Urine pH   5.5 


 


Urine Specific Gravity   1.020 


 


Urine Protein   TRACE mg/dL 


 


Urine Glucose (UA)   NEG mg/dL 


 


Urine Ketones   10 mg/dL 


 


Urine Occult Blood   NEG 


 


Urine Nitrite   NEG 


 


Urine Bilirubin   NEG 


 


Urine Urobilinogen


  


  


  LESS THAN 2.0


MG/DL


 


Urine Leukocyte Esterase   NEG 


 


Urine RBC


  


  


  LESS THAN 1


/hpf


 


Urine WBC


  


  


  LESS THAN 1


/hpf


 


Urine Squamous Epithelial


Cells 


  


  <1 /hpf 


 


 


Urine Hyaline Casts   2 /lpf 


 


Urine Mucus   FEW /lpf 


 


Microscopic Urinalysis Comment


  


  


  CULT NOT


INDICATED


 


Urine Opiates Screen   NEG 


 


Urine Barbiturates Screen   NEG 


 


Urine Amphetamines Screen   NEG 


 


Urine Benzodiazepines Screen   POS 


 


Urine Cocaine Screen   NEG 


 


Urine Cannabinoids Screen   NEG 








Vital Signs








  Date Time  Temp Pulse Resp B/P (MAP) Pulse Ox O2 Delivery O2 Flow Rate FiO2


 


6/12/18 13:07 98.3 80 20 146/76 (99) 96 Room Air  


 


6/12/18 12:55 98.3 80 20 146/76 (99) 96   








Differential Diagnosis


Mood disorder versus metabolic abnormality versus substance abuse versus 

suicidal ideations versus other


Narrative Course


Patient is a 73-year-old male presenting under a Davis act [facility].  Patient is well-appearing, his vital signs are 

stable.  Patient does appear to laugh excessively but he is cooperative and 

pleasant otherwise.  Mental health screening discussed with the patient. 

Psychiatric screen ordered.  CBC with no acute findings, chemistry is 

unremarkable, thyroid panel with no acute findings.  Vitamin D level is 26.3.  

Urinalysis unremarkable.  Urine drug screen is positive for benzodiazepines, 

alcohol level is 248.


At 1600 patient complained of midsternal chest pain to RN.  Cardiac enzymes, EKG

, chest x-ray ordered.  Cardiac enzymes are negative 1 set, EKG shows atrial 

fibrillation, his rate is controlled.  This was reviewed by my attending 

physician.


Patient was also seen and evaluated by my attending physician.  At this time 

patient will be admitted to medicine for further workup and evaluation.  

Discussed with Dr. Paz who accepted admission, admit orders placed.  

Patient has been resting comfortably.  He did report that he has had 

intermittent chest pain for a while.  He was supposed to have heart surgery but 

was scared.





Diagnosis





 Primary Impression:  


 Chest pain


 Qualified Codes:  R07.9 - Chest pain, unspecified





Admitting Information


Admitting Physician Requests:  Observation


Condition:  Stable











Krystle Rousseau Jun 12, 2018 13:43

## 2018-06-12 NOTE — HHI.HP
__________________________________________________





HPI


Service


[facility]


Primary Care Physician


[facility]


Admission Diagnosis





CHEST PAIN, DAVIS ACT


Diagnoses:  


Travel History


International Travel<30 Days:  No


Contact w/Intl Traveler <30 Da:  No


Traveled to Known Affected Are:  No


History of Present Illness


73-year-old male with a past medical history significant for hypertension, 

hyperlipidemia, CHF, A. fib (anticoagulated on aspirin) and CAD presents to the 

emergency department under Davis act [facility].  During 

her interview, the patient states he cannot tell me why he was here.  He 

reports having 2-3 shots of hard liquor this morning prior to his appointment 

[facility] the [facility].  [facility] he was found to have strange behavior and was Davis 

acted and sent to [place] for further evaluation.  During his time at [place] 

he developed substernal chest pain that radiated down his left arm.  He reports 

he continues to have this chest pain.  He endorses associated shortness of 

breath and palpitations.  He denies any abdominal pain.  No nausea/vomiting/

diarrhea.  No lateralizing signs/symptoms.





Review of Systems


Except as stated in HPI:  all other systems reviewed are Neg





Past Family Social History


Past Medical History


hypertension, hyperlipidemia, CHF, A. fib (anticoagulated on aspirin) and CAD


Past Surgical History


None


Reported Medications





Reported Meds & Active Scripts


Active


Reported


Aspirin 325 Mg Tab 325 Mg PO DAILY


Omeprazole 20 Mg Tab 20 Mg PO DAILY


Nitroglycerin SL (Nitroglycerin) 0.4 Mg Subl 0.4 Mg SL AS DIRECTED PRN


     ONE TABLET UNDER THE TONGUE AS NEEDED FOR CHEST PAIN, MAY REPEAT EVERY


     FIVE MINUTES FOR A TOTAL OF 3 DOSES OR CALL 911 IF NO RELIEF


Lidocaine Topical (Lidocaine) 4 % Cream 1 Applic TOPICAL QID


     Apply to affected area on skin


Levothyroxine (Levothyroxine Sodium) 25 Mcg Tab 25 Mcg PO DAILY


Isosorbide Mononitrate ER (Isosorbide Mononitrate) 30 Mg Kal 30 Mg PO DAILY


Neurontin (Gabapentin) 400 Mg Cap 400 Mg PO TID


Lasix (Furosemide) 40 Mg Tab 40 Mg PO BID


Coreg (Carvedilol) 6.25 Mg Tab 3.125 Mg PO BID


Lipitor (Atorvastatin Calcium) 80 Mg Tab 80 Mg PO HS


Norvasc (Amlodipine Besylate) 2.5 Mg Tab 2.5 Mg PO DAILY


Alfuzosin HCl ER (Alfuzosin HCl) 10 Mg Tab 10 Mg PO DAILY


Allergies:  


Coded Allergies:  


     allopurinol (Unverified  Allergy, Severe, 8/15/17)


     naproxen (Unverified  Allergy, Severe, Itching, 8/15/17)


Family History


Father with CVA.  Mother with CAD.


Social History


Patient denies any alcohol except for admits to taking 2-3 shots a day prior to 

going to the emergency department.  He adamantly denies any other recent 

alcohol use.  Denies tobacco and illicit drugs.





Physical Exam


Vital Signs





Vital Signs








  Date Time  Temp Pulse Resp B/P (MAP) Pulse Ox O2 Delivery O2 Flow Rate FiO2


 


18 20:16 98.0 92 19 159/68 (98) 96   


 


18 13:44     96 Room Air  


 


18 13:07 98.3 80 20 146/76 (99) 96 Room Air  


 


18 12:55 98.3 80 20 146/76 (99) 96   








Physical Exam


GENERAL:  male sitting up in bed


SKIN: No rashes, ecchymoses or lesions. Cool and dry.


HEAD: Atraumatic. Normocephalic. No temporal or scalp tenderness.


EYES: Pupils equal round and reactive. Extraocular motions intact. No scleral 

icterus. No injection or drainage. 


ENT: Nose without bleeding, purulent drainage or septal hematoma. Throat 

without erythema, tonsillar hypertrophy or exudate. Uvula midline. Airway 

patent.


NECK: Trachea midline. No JVD or lymphadenopathy. Supple, nontender, no 

meningeal signs.


CARDIOVASCULAR: Regular rate and irregularly irregular rhythm without murmurs, 

gallops, or rubs. 


RESPIRATORY: Clear to auscultation. Breath sounds equal bilaterally. No wheezes

, rales, or rhonchi.  


GASTROINTESTINAL: Abdomen soft, non-tender, nondistended. No hepato-splenomegaly

, or palpable masses. No guarding.


MUSCULOSKELETAL: Extremities without clubbing, cyanosis, or edema. No joint 

tenderness, effusion, or edema noted. No calf tenderness. 


NEUROLOGICAL: Awake and alert. Cranial nerves II through XII intact.  Motor and 

sensory grossly within normal limits.  Normal speech.


Laboratory





Laboratory Tests








Test


  18


13:42 18


15:57 18


16:30 18


19:57


 


White Blood Count 6.3    


 


Red Blood Count 4.67    


 


Hemoglobin 14.4    


 


Hematocrit 41.7    


 


Mean Corpuscular Volume 89.3    


 


Mean Corpuscular Hemoglobin 30.9    


 


Mean Corpuscular Hemoglobin


Concent 34.6 


  


  


  


 


 


Red Cell Distribution Width 13.9    


 


Platelet Count 185    


 


Mean Platelet Volume 8.8    


 


Neutrophils (%) (Auto) 53.8    


 


Lymphocytes (%) (Auto) 37.0    


 


Monocytes (%) (Auto) 6.0    


 


Eosinophils (%) (Auto) 2.2    


 


Basophils (%) (Auto) 1.0    


 


Neutrophils # (Auto) 3.4    


 


Lymphocytes # (Auto) 2.3    


 


Monocytes # (Auto) 0.4    


 


Eosinophils # (Auto) 0.1    


 


Basophils # (Auto) 0.1    


 


CBC Comment DIFF FINAL    


 


Differential Comment     


 


Blood Urea Nitrogen 12    


 


Creatinine 1.11    


 


Random Glucose 84    


 


Total Protein 8.3    


 


Albumin 4.0    


 


Calcium Level 8.7    


 


Alkaline Phosphatase 78    


 


Aspartate Amino Transf


(AST/SGOT) 33 


  


  


  


 


 


Alanine Aminotransferase


(ALT/SGPT) 34 


  


  


  


 


 


Total Bilirubin 0.7    


 


Sodium Level 141    


 


Potassium Level 3.9    


 


Chloride Level 105    


 


Carbon Dioxide Level 21.8    


 


Anion Gap 14    


 


Estimat Glomerular Filtration


Rate 65 


  


  


  


 


 


25-Hydroxy Vitamin D Total 26.3    


 


Free Thyroxine 1.19    


 


Thyroid Stimulating Hormone


3rd Gen 0.902 


  


  


  


 


 


Salicylates Level LESS THAN 1.7    


 


Acetaminophen Level LESS THAN 2.0    


 


Ethyl Alcohol Level 248    


 


Prothrombin Time  22.8   


 


Prothromb Time International


Ratio 


  2.3 


  


  


 


 


Activated Partial


Thromboplast Time 


  51.5 


  


  


 


 


Magnesium Level  2.0   


 


Total Creatine Kinase  204   


 


Creatine Kinase MB  2.1   


 


Troponin I  0.04   


 


Lipase  93   


 


Urine Color   YELLOW  


 


Urine Turbidity   CLEAR  


 


Urine pH   5.5  


 


Urine Specific Gravity   1.020  


 


Urine Protein   TRACE  


 


Urine Glucose (UA)   NEG  


 


Urine Ketones   10  


 


Urine Occult Blood   NEG  


 


Urine Nitrite   NEG  


 


Urine Bilirubin   NEG  


 


Urine Urobilinogen   LESS THAN 2.0  


 


Urine Leukocyte Esterase   NEG  


 


Urine RBC   LESS THAN 1  


 


Urine WBC   LESS THAN 1  


 


Urine Squamous Epithelial


Cells 


  


  <1 


  


 


 


Urine Hyaline Casts   2  


 


Urine Mucus   FEW  


 


Microscopic Urinalysis Comment


  


  


  CULT NOT


INDICATED 


 


 


Urine Opiates Screen   NEG  


 


Urine Barbiturates Screen   NEG  


 


Urine Amphetamines Screen   NEG  


 


Urine Benzodiazepines Screen   POS  


 


Urine Cocaine Screen   NEG  


 


Urine Cannabinoids Screen   NEG  








Result Diagram:  


18 1342                                                                   

             18 1342








Caprini VTE Risk Assessment


Caprini VTE Risk Assessment:  Mod/High Risk (score >= 2)


Caprini Risk Assessment Model











 Point Value = 1          Point Value = 2  Point Value = 3  Point Value = 5


 


Age 41-60


Minor surgery


BMI > 25 kg/m2


Swollen legs


Varicose veins


Pregnancy or postpartum


History of unexplained or recurrent


   spontaneous 


Oral contraceptives or hormone


   replacement


Sepsis (< 1 month)


Serious lung disease, including


   pneumonia (< 1 month)


Abnormal pulmonary function


Acute myocardial infarction


Congestive heart failure (< 1 month)


History of inflammatory bowel disease


Medical patient at bed rest Age 61-74


Arthroscopic surgery


Major open surgery (> 45 min)


Laparoscopic surgery (> 45 min)


Malignancy


Confined to bed (> 72 hours)


Immobilizing plaster cast


Central venous access Age >= 75


History of VTE


Family history of VTE


Factor V Leiden


Prothrombin [id number]


Lupus anticoagulant


Anticardiolipin antibodies


Elevated serum homocysteine


Heparin-induced thrombocytopenia


Other congenital or acquired


   thrombophilia Stroke (< 1 month)


Elective arthroplasty


Hip, pelvis, or leg fracture


Acute spinal cord injury (< 1 month)








Prophylaxis Regimen











   Total Risk


Factor Score Risk Level Prophylaxis Regimen


 


0-1      Low Early ambulation


 


2 Moderate Order ONE of the following:


*Sequential Compression Device (SCD)


*Heparin 5000 units SQ BID


 


3-4 Higher Order ONE of the following medications:


*Heparin 5000 units SQ TID


*Enoxaparin/Lovenox 40 mg SQ daily (WT < 150 kg, CrCl > 30 mL/min)


*Enoxaparin/Lovenox 30 mg SQ daily (WT < 150 kg, CrCl > 10-29 mL/min)


*Enoxaparin/Lovenox 30 mg SQ BID (WT < 150 kg, CrCl > 30 mL/min)


AND/OR


*Sequential Compression Device (SCD)


 


5 or more Highest Order ONE of the following medications:


*Heparin 5000 units SQ TID (Preferred with Epidurals)


*Enoxaparin/Lovenox 40 mg SQ daily (WT < 150 kg, CrCl > 30 mL/min)


*Enoxaparin/Lovenox 30 mg SQ daily (WT < 150 kg, CrCl > 10-29 mL/min)


*Enoxaparin/Lovenox 30 mg SQ BID (WT < 150 kg, CrCl > 30 mL/min)


AND


*Sequential Compression Device (SCD)











Assessment and Plan


Assessment and Plan


Assessment/plan:





1.  Chest pain


EKG significant for atrial fibrillation, pulse 74, without ST segment 

elevations or depressions


Initial troponin 0.04


ACS rule out pending; serial troponins/EKGs


Aspirin


Morphine


Cardiology consulted, appreciate recommendations





2.  Alcohol intoxication


Patient placed under Baker act


Psychiatry consulted, appreciate recommendations


Thiamine/folate/multivitamins


[facility] protocol





3.  Atrial fibrillation/CHF


Continue home medications


The patient reports he had adverse reactions to anticoagulants therefore he is 

only anticoagulated on aspirin





4.  Hypertension/hyperlipidemia/CAD/hypothyroidism


Continue home medication





FEN


N.p.o.


Electrolytes: Monitor and replete as needed


Heparin











Mariann Ray MD 2018 20:35

## 2018-06-12 NOTE — EKG
Date Performed: 06/12/2018       Time Performed: 15:39:54

 

PTAGE:      73 years

 

EKG:      ATRIAL FIBRILLATION LOW QRS VOLTAGE IN PRECORDIAL LEADS ABNORMAL RHYTHM ECG

 

PREVIOUS TRACING       : 07/21/2017 11.46

 

DOCTOR:   Scott Patricia  Interpretating Date/Time  06/12/2018 20:28:35

## 2018-06-12 NOTE — RADRPT
EXAM DATE:  6/12/2018 6:10 PM EDT

AGE/SEX:        73 years / Male



INDICATIONS:  Short of breath.



CLINICAL DATA:  This is the patient's subsequent encounter. Patient reports that signs and symptoms h
ave been present for 4 - 6 days and indicates a pain score of 0/10. 

                                                                          

MEDICAL/SURGICAL HISTORY:       None. None.



[place]      [facility] [place].  . 





FINDINGS:  

A single AP view of the chest demonstrates the lungs to be symmetrically aerated without evidence of 
mass, infiltrate or effusion.  The cardiomediastinal contours are unremarkable.  Osseous structures a
re intact.  





CONCLUSION: 

Negative chest. No pneumothorax or failure.



Electronically signed by: Brian Sheets MD  6/12/2018 6:11 PM EDT

## 2018-06-12 NOTE — PD
Data


Data


Last Documented VS





Vital Signs








  Date Time  Temp Pulse Resp B/P (MAP) Pulse Ox O2 Delivery O2 Flow Rate FiO2


 


6/12/18 13:44     96 Room Air  


 


6/12/18 13:07 98.3 80 20     








Orders





 Orders


Complete Blood Count With Diff (6/12/18 13:29)


Comprehensive Metabolic Panel (6/12/18 13:29)


Thyroid Stimulating Hormone (6/12/18 13:29)


Urinalysis - C+S If Indicated (6/12/18 13:29)


Oximetry (6/12/18 13:29)


Iv Access Insert/Monitor (6/12/18 13:29)


Ecg Monitoring (6/12/18 13:29)


Psych Screen (6/12/18 13:29)


Sodium Chloride 0.9% Flush (Ns Flush) (6/12/18 13:30)


Drug Screen, Random Urine (6/12/18 13:29)


Alcohol (Ethanol) (6/12/18 13:29)


Salicylates (Aspirin) (6/12/18 13:29)


Tylenol (Acetaminophen) (6/12/18 13:29)


Free Thyroxine (T4) (6/12/18 13:29)


Vitamin D, 25-Hydroxy (6/12/18 13:29)


Diet Heart Healthy (6/12/18 Dinner)


Alcohol Withdrawal Asmt-Ciwa ONCE (6/12/18 14:45)


Acetaminophen (Tylenol) (6/12/18 14:45)


Flumazenil Inj (Romazicon Inj) (6/12/18 14:45)


Lorazepam (Ativan) (6/12/18 14:45)


Lorazepam Inj (Ativan Inj) (6/12/18 14:45)


Lorazepam (Ativan) (6/12/18 14:45)


Lorazepam Inj (Ativan Inj) (6/12/18 14:45)


Ergocalciferol (Drisdol) (6/12/18 14:45)


Electrocardiogram (6/12/18 )


Ckmb (Isoenzyme) Profile (6/12/18 15:52)


Magnesium (Mg) (6/12/18 15:52)


Prothrombin Time / Inr (Pt) (6/12/18 15:52)


Act Partial Throm Time (Ptt) (6/12/18 15:52)


Troponin I (6/12/18 15:52)


Lipase (6/12/18 15:52)


CKMB (6/12/18 15:57)


CKMB% (6/12/18 15:57)


Admit Order (Ed Use Only) (6/12/18 17:42)


Chest, Single Ap (6/12/18 )





Labs





Laboratory Tests








Test


  6/12/18


13:42 6/12/18


15:57 6/12/18


16:30


 


White Blood Count 6.3 TH/MM3   


 


Red Blood Count 4.67 MIL/MM3   


 


Hemoglobin 14.4 GM/DL   


 


Hematocrit 41.7 %   


 


Mean Corpuscular Volume 89.3 FL   


 


Mean Corpuscular Hemoglobin 30.9 PG   


 


Mean Corpuscular Hemoglobin


Concent 34.6 % 


  


  


 


 


Red Cell Distribution Width 13.9 %   


 


Platelet Count 185 TH/MM3   


 


Mean Platelet Volume 8.8 FL   


 


Neutrophils (%) (Auto) 53.8 %   


 


Lymphocytes (%) (Auto) 37.0 %   


 


Monocytes (%) (Auto) 6.0 %   


 


Eosinophils (%) (Auto) 2.2 %   


 


Basophils (%) (Auto) 1.0 %   


 


Neutrophils # (Auto) 3.4 TH/MM3   


 


Lymphocytes # (Auto) 2.3 TH/MM3   


 


Monocytes # (Auto) 0.4 TH/MM3   


 


Eosinophils # (Auto) 0.1 TH/MM3   


 


Basophils # (Auto) 0.1 TH/MM3   


 


CBC Comment DIFF FINAL   


 


Differential Comment    


 


Blood Urea Nitrogen 12 MG/DL   


 


Creatinine 1.11 MG/DL   


 


Random Glucose 84 MG/DL   


 


Total Protein 8.3 GM/DL   


 


Albumin 4.0 GM/DL   


 


Calcium Level 8.7 MG/DL   


 


Alkaline Phosphatase 78 U/L   


 


Aspartate Amino Transf


(AST/SGOT) 33 U/L 


  


  


 


 


Alanine Aminotransferase


(ALT/SGPT) 34 U/L 


  


  


 


 


Total Bilirubin 0.7 MG/DL   


 


Sodium Level 141 MEQ/L   


 


Potassium Level 3.9 MEQ/L   


 


Chloride Level 105 MEQ/L   


 


Carbon Dioxide Level 21.8 MEQ/L   


 


Anion Gap 14 MEQ/L   


 


Estimat Glomerular Filtration


Rate 65 ML/MIN 


  


  


 


 


25-Hydroxy Vitamin D Total 26.3 ng/ML   


 


Free Thyroxine 1.19 NG/DL   


 


Thyroid Stimulating Hormone


3rd Gen 0.902 uIU/ML 


  


  


 


 


Salicylates Level


  LESS THAN 1.7


MG/DL 


  


 


 


Acetaminophen Level


  LESS THAN 2.0


MCG/ML 


  


 


 


Ethyl Alcohol Level 248 MG/DL   


 


Prothrombin Time  22.8 SEC  


 


Prothromb Time International


Ratio 


  2.3 RATIO 


  


 


 


Activated Partial


Thromboplast Time 


  51.5 SEC 


  


 


 


Magnesium Level  2.0 MG/DL  


 


Total Creatine Kinase  204 U/L  


 


Creatine Kinase MB  2.1 NG/ML  


 


Troponin I  0.04 NG/ML  


 


Lipase  93 U/L  


 


Urine Color   YELLOW 


 


Urine Turbidity   CLEAR 


 


Urine pH   5.5 


 


Urine Specific Gravity   1.020 


 


Urine Protein   TRACE mg/dL 


 


Urine Glucose (UA)   NEG mg/dL 


 


Urine Ketones   10 mg/dL 


 


Urine Occult Blood   NEG 


 


Urine Nitrite   NEG 


 


Urine Bilirubin   NEG 


 


Urine Urobilinogen


  


  


  LESS THAN 2.0


MG/DL


 


Urine Leukocyte Esterase   NEG 


 


Urine RBC


  


  


  LESS THAN 1


/hpf


 


Urine WBC


  


  


  LESS THAN 1


/hpf


 


Urine Squamous Epithelial


Cells 


  


  <1 /hpf 


 


 


Urine Hyaline Casts   2 /lpf 


 


Urine Mucus   FEW /lpf 


 


Microscopic Urinalysis Comment


  


  


  CULT NOT


INDICATED


 


Urine Opiates Screen   NEG 


 


Urine Barbiturates Screen   NEG 


 


Urine Amphetamines Screen   NEG 


 


Urine Benzodiazepines Screen   POS 


 


Urine Cocaine Screen   NEG 


 


Urine Cannabinoids Screen   NEG 











MDM


Supervised Visit with JOSEFINA:  Yes


Narrative Course


I, Dr. Cabral, have reviewed the advance practice practitioner's documentation 

and am in agreement, met with the patient face to face, made the diagnosis, and 

the medical decision making was done by me.  





*My assessment and Findings: Patient seen and examined by me in addition to 

Krystle Stoner.  73-year-old male presents emergency department under Baker act, 

was heavily intoxicated on arrival prior to the start of my shift, he is still 

very tangential and has to be redirected many a time during his history.  He 

complains of left-sided chest pain radiating to shoulder and [facility] before but refused it.  At this time he is 

intoxicated under the Baker act and I cannot clear him medically for psychiatry 

will be admitted to hospice for further workup of his chest pain.  He is under 

a Baker act peer











Francisco J Cabral MD Jun 12, 2018 17:30

## 2018-06-13 VITALS
RESPIRATION RATE: 18 BRPM | HEART RATE: 80 BPM | OXYGEN SATURATION: 97 % | DIASTOLIC BLOOD PRESSURE: 85 MMHG | TEMPERATURE: 97.7 F | SYSTOLIC BLOOD PRESSURE: 123 MMHG

## 2018-06-13 VITALS
HEART RATE: 77 BPM | RESPIRATION RATE: 17 BRPM | SYSTOLIC BLOOD PRESSURE: 132 MMHG | TEMPERATURE: 97.9 F | DIASTOLIC BLOOD PRESSURE: 62 MMHG | OXYGEN SATURATION: 95 %

## 2018-06-13 VITALS — RESPIRATION RATE: 18 BRPM

## 2018-06-13 LAB
ALBUMIN SERPL-MCNC: 3.4 GM/DL (ref 3.4–5)
ALP SERPL-CCNC: 71 U/L (ref 45–117)
ALT SERPL-CCNC: 31 U/L (ref 12–78)
AST SERPL-CCNC: 25 U/L (ref 15–37)
BASOPHILS # BLD AUTO: 0.1 TH/MM3 (ref 0–0.2)
BASOPHILS NFR BLD: 1.2 % (ref 0–2)
BILIRUB SERPL-MCNC: 1 MG/DL (ref 0.2–1)
BUN SERPL-MCNC: 11 MG/DL (ref 7–18)
CALCIUM SERPL-MCNC: 8.1 MG/DL (ref 8.5–10.1)
CHLORIDE SERPL-SCNC: 102 MEQ/L (ref 98–107)
CHOLEST SERPL-MCNC: 176 MG/DL (ref 120–200)
CHOLESTEROL/ HDL RATIO: 3.43 RATIO
CREAT SERPL-MCNC: 0.95 MG/DL (ref 0.6–1.3)
EOSINOPHIL # BLD: 0.1 TH/MM3 (ref 0–0.4)
EOSINOPHIL NFR BLD: 1.9 % (ref 0–4)
ERYTHROCYTE [DISTWIDTH] IN BLOOD BY AUTOMATED COUNT: 14.1 % (ref 11.6–17.2)
GFR SERPLBLD BASED ON 1.73 SQ M-ARVRAT: 77 ML/MIN (ref 89–?)
GLUCOSE SERPL-MCNC: 104 MG/DL (ref 74–106)
HBA1C MFR BLD: 5.6 % (ref 4.3–6)
HCO3 BLD-SCNC: 27.4 MEQ/L (ref 21–32)
HCT VFR BLD CALC: 38.1 % (ref 39–51)
HDLC SERPL-MCNC: 51.3 MG/DL (ref 40–60)
HGB BLD-MCNC: 13 GM/DL (ref 13–17)
LDLC SERPL-MCNC: 111 MG/DL (ref 0–99)
LYMPHOCYTES # BLD AUTO: 1.5 TH/MM3 (ref 1–4.8)
LYMPHOCYTES NFR BLD AUTO: 27.6 % (ref 9–44)
MAGNESIUM SERPL-MCNC: 1.8 MG/DL (ref 1.5–2.5)
MAGNESIUM SERPL-MCNC: 1.8 MG/DL (ref 1.5–2.5)
MCH RBC QN AUTO: 30.5 PG (ref 27–34)
MCHC RBC AUTO-ENTMCNC: 34 % (ref 32–36)
MCV RBC AUTO: 89.8 FL (ref 80–100)
MONOCYTE #: 0.7 TH/MM3 (ref 0–0.9)
MONOCYTES NFR BLD: 12.1 % (ref 0–8)
NEUTROPHILS # BLD AUTO: 3.1 TH/MM3 (ref 1.8–7.7)
NEUTROPHILS NFR BLD AUTO: 57.2 % (ref 16–70)
PHOSPHATE SERPL-MCNC: 2.7 MG/DL (ref 2.5–4.9)
PHOSPHATE SERPL-MCNC: 2.9 MG/DL (ref 2.5–4.9)
PLATELET # BLD: 160 TH/MM3 (ref 150–450)
PMV BLD AUTO: 8.9 FL (ref 7–11)
PROT SERPL-MCNC: 7.4 GM/DL (ref 6.4–8.2)
RBC # BLD AUTO: 4.25 MIL/MM3 (ref 4.5–5.9)
SODIUM SERPL-SCNC: 141 MEQ/L (ref 136–145)
T4 FREE SERPL-MCNC: 0.94 NG/DL (ref 0.76–1.46)
TRIGL SERPL-MCNC: 69 MG/DL (ref 42–150)
TROPONIN I SERPL-MCNC: 0.05 NG/ML (ref 0.02–0.05)
TROPONIN I SERPL-MCNC: 0.05 NG/ML (ref 0.02–0.05)
WBC # BLD AUTO: 5.4 TH/MM3 (ref 4–11)

## 2018-06-13 RX ADMIN — HEPARIN SODIUM SCH UNITS: 10000 INJECTION, SOLUTION INTRAVENOUS; SUBCUTANEOUS at 05:40

## 2018-06-13 RX ADMIN — MULTIPLE VITAMINS W/ MINERALS TAB SCH TAB: TAB at 08:59

## 2018-06-13 RX ADMIN — ISOSORBIDE MONONITRATE SCH MG: 30 TABLET, EXTENDED RELEASE ORAL at 08:59

## 2018-06-13 RX ADMIN — Medication SCH ML: at 08:58

## 2018-06-13 RX ADMIN — FOLIC ACID SCH MG: 1 TABLET ORAL at 08:59

## 2018-06-13 RX ADMIN — FUROSEMIDE SCH MG: 40 TABLET ORAL at 08:59

## 2018-06-13 RX ADMIN — OXYCODONE HYDROCHLORIDE AND ACETAMINOPHEN PRN TAB: 10; 325 TABLET ORAL at 09:00

## 2018-06-13 RX ADMIN — Medication SCH MG: at 08:59

## 2018-06-13 RX ADMIN — STANDARDIZED SENNA CONCENTRATE AND DOCUSATE SODIUM SCH TAB: 8.6; 5 TABLET, FILM COATED ORAL at 08:59

## 2018-06-13 RX ADMIN — GABAPENTIN SCH MG: 400 CAPSULE ORAL at 08:59

## 2018-06-13 RX ADMIN — CARVEDILOL SCH MG: 6.25 TABLET, FILM COATED ORAL at 08:48

## 2018-06-13 RX ADMIN — FAMOTIDINE SCH MG: 20 TABLET, FILM COATED ORAL at 08:59

## 2018-06-13 NOTE — PD.[email or web address]
Provisional Diagnosis


Admission Date


Jun 12, 2018 at 17:45


Axis I.


Alcohol-induced mood disorder, alcohol use disorder, history of anxiety


Axis II.


Deferred


Axis III.


Hypertension, arthritis, BPH, A. fib


Axis IV.


Poor insight of alcohol use disorder


Axis V.


55





History of Present Illness


Service


Psychiatry


Consult Requested By


ER


Reason for Consult


Patient is under Baker act


Primary Care Physician


[facility]


HPI


The patient is a very pleasant 73-year-old  man, domiciled in [facility], , , with psychiatric history of anxiety, no previous 

psychiatric hospitalizations, no previous suicide attempts, he reports 

occasional use of alcohol, denies history of detox/rehabs, withdrawal symptoms 

with a past medical history significant for hypertension, hyperlipidemia, BPH, 

arthritis, CHF, A. fib (anticoagulated on aspirin) and CAD presents to the 

emergency department under [place] act [facility] in [facility] 

context of alcohol intoxication.  Initial BAL was 248.  Is now under 

observation due to A. fib.  EMR reviewed.  Case discussed with primary 

attending and nursing charge.  On psychiatric evaluation the patient is calm, 

cooperative, pleasant.  The patient reports that yesterday he was a little bit 

excited, he drank some alcohol "because I wanted to relax myself before going 

to the doctor" and when [facility] "I was laughing a lot, that is 

true, I cannot understand how is that a treat to self or other".  At the moment 

of this evaluation the patient reports good mood, he denies anhedonia, denies 

hopelessness, denies helplessness, denies anxiety, he denies symptoms of 

withdrawal, denies suicidal enemas ideation, he denies visual and auditory 

hallucinations.  The patient is fully oriented 3, no attention deficit, no 

fluctuation of consciousness.  He does report occasional use of alcohol, denies 

illegal drugs.





Review of Systems


Constitutional:  DENIES: Diaphoretic episodes, Fatigue, Fever, Weight gain, 

Weight loss, Chills, Dizziness, Change in appetite, Night Sweats


Endocrine:  DENIES: Heat/cold intolerance, Polydipsia, Polyuria, Polyphagia


Eyes:  DENIES: Blurred vision, Diplopia, Eye inflammation, Eye pain, Vision loss

, Photosensitivity, Double Vision


Ears, nose, mouth, throat:  DENIES: Tinnitus, Hearing loss, Vertigo, Nasal 

discharge, Oral lesions, Throat pain, Hoarseness, Ear Pain, Running Nose, 

Epistaxis, Sinus Pain, Toothache, Odynophagia


Respiratory:  DENIES: Apneas, Cough, Snoring, Wheezing, Hemoptysis, Sputum 

production, Shortness of breath


Cardiovascular:  DENIES: Chest pain, Palpitations, Syncope, Dyspnea on Exertion

, PND, Lower Extremity Edema, Orthopnea, Claudication


Gastrointestinal:  DENIES: Abdominal pain, Black stools, Bloody stools, 

Constipation, Diarrhea, Nausea, Vomiting, Difficulty Swallowing, Anorexia


Musculoskeletal:  DENIES: Joint pain, Muscle aches, Stiffness, Joint Swelling, 

Back pain, Neck pain


Integumentary:  DENIES: Abnormal pigmentation, Nail changes, Pruritus, Rash


Hematologic/lymphatic:  DENIES: Bruising, Lymphadenopathy


Immunologic/allergic:  DENIES: Eczema, Urticaria


Neurologic:  DENIES: Abnormal gait, Headache, Localized weakness, Paresthesias, 

Seizures, Speech Problems, Tremor, Poor Balance


Psychiatric:  DENIES: Anxiety, Confusion, Mood changes, Depression, 

Hallucinations, Agitation, Suicidal Ideation, Homicidal Ideation, Delusions





Past Family Social History


Coded Allergies:  


     allopurinol (Unverified  Allergy, Severe, 8/15/17)


     naproxen (Unverified  Allergy, Severe, Itching, 8/15/17)


Reported Medications


Aspirin (Aspirin) 325 Mg Tab, 325 MG PO DAILY, #30 TAB 0 Refills


   6/12/18


Omeprazole (Omeprazole) 20 Mg Tab, 20 MG PO DAILY, #30 TAB 0 Refills


   6/12/18


Nitroglycerin SL (Nitroglycerin SL) 0.4 Mg Subl, 0.4 MG SL AS DIRECTED Y for 

CHEST PAIN, #100 TAB.SL 0 Refills


   ONE TABLET UNDER THE TONGUE AS NEEDED FOR CHEST PAIN, MAY REPEAT EVERY


   FIVE MINUTES FOR A TOTAL OF 3 DOSES OR CALL 911 IF NO RELIEF


   6/12/18


Lidocaine Topical (Lidocaine Topical) 4 % Cream, 1 APPLIC TOPICAL QID for 

Anesthetic Relief


   Apply to affected area on skin


   6/12/18


Levothyroxine (Levothyroxine) 25 Mcg Tab, 25 MCG PO DAILY for Thyroid, #30 TAB 

0 Refills


   6/12/18


Isosorbide Mononitrate ER (Isosorbide Mononitrate ER) 30 Mg Kal, 30 MG PO 

DAILY for Prevent Chest Pain, #30 TAB 0 Refills


   6/12/18


Gabapentin (Neurontin) 400 Mg Cap, 400 MG PO TID for Pain Management, #30 CAP 0 

Refills


   6/12/18


Furosemide (Lasix) 40 Mg Tab, 40 MG PO BID for Diuretic, #60 TAB 0 Refills


   6/12/18


Carvedilol (Coreg) 6.25 Mg Tab, 3.125 MG PO BID for Heart, #60 TAB 0 Refills


   6/12/18


Atorvastatin (Lipitor) 80 Mg Tab, 80 MG PO HS for Cholesterol Management, #30 

TAB 0 Refills


   6/12/18


Amlodipine (Norvasc) 2.5 Mg Tab, 2.5 MG PO DAILY for Blood Pressure Management, 

#30 TAB 0 Refills


   6/12/18


Alfuzosin HCl (Alfuzosin HCl ER) 10 Mg Tab, 10 MG PO DAILY for Prostate


   6/12/18


Discontinued Scripts


Ondansetron Odt (Zofran Odt) 4 Mg Tab, 4 MG SL Q6HR Y for Nausea/Vomiting, #30 

TAB 0 Refills


   Prov:Renay Solomon MD         7/21/17





Current Medications








 Medications


  (Trade)  Dose


 Ordered  Sig/Robert


 Route  Start Time


 Stop Time Status Last Admin


 


  (Catapres)  0.1 mg  Q4H  PRN


 PO  6/12/18 17:45


     


 


 


  (Tylenol)  650 mg  Q4H  PRN


 PO  6/12/18 18:00


     


 


 


  (Zofran  Odt)  4 mg  Q6H  PRN


 PO  6/12/18 18:00


    6/13/18 00:35


 


 


  (Reglan Inj)  5 mg  Q6H  PRN


 IV PUSH  6/12/18 18:00


     


 


 


  (Tylenol)  650 mg  Q6H  PRN


 PO  6/12/18 18:00


     


 


 


  (Percocet  5-325


 Mg)  1 tab  Q6H  PRN


 PO  6/12/18 18:00


     


 


 


  (Percocet 


 Mg)  1 tab  Q6H  PRN


 PO  6/12/18 18:00


    6/13/18 09:00


 


 


  (Morphine Inj)  2 mg  Q3H  PRN


 IV PUSH  6/12/18 18:00


     


 


 


  (Morphine Inj)  4 mg  Q3H  PRN


 IV PUSH  6/12/18 18:00


     


 


 


  (Morphine Inj)  4 mg  Q3H  PRN


 IV PUSH  6/12/18 18:00


     


 


 


  (Narcan Inj)  0.4 mg  UNSCH  PRN


 IV PUSH  6/12/18 18:00


     


 


 


  (Whitney-Colace)  1 tab  BID


 PO  6/12/18 21:00


    6/13/18 08:59


 


 


  (Milk Of


 Magnesia Liq)  30 ml  Q12H  PRN


 PO  6/12/18 18:00


     


 


 


  (Senokot)  17.2 mg  Q12H  PRN


 PO  6/12/18 18:00


     


 


 


  (Dulcolax Supp)  10 mg  DAILY  PRN


 RECTAL  6/12/18 18:00


     


 


 


  (Lactulose Liq)  30 ml  DAILY  PRN


 PO  6/12/18 18:00


     


 


 


  (Nitrostat Sl)  0.4 mg  Q5M  PRN


 SL  6/12/18 18:00


     


 


 


  (Pepcid)  20 mg  BID


 PO  6/12/18 21:00


    6/13/18 08:59


 


 


  (Norvasc)  2.5 mg  DAILY


 PO  6/13/18 09:00


     


 


 


  (Aspirin)  325 mg  DAILY


 PO  6/13/18 09:00


    6/13/18 08:58


 


 


  (Lipitor)  80 mg  HS


 PO  6/12/18 21:00


     


 


 


  (Coreg)  3.125 mg  BID


 PO  6/12/18 21:00


    6/12/18 21:05


 


 


  (Lasix)  40 mg  BID


 PO  6/12/18 21:00


    6/13/18 08:59


 


 


  (Neurontin)  400 mg  TID


 PO  6/12/18 18:00


    6/13/18 08:59


 


 


  (Imdur)  30 mg  DAILY


 PO  6/12/18 18:00


    6/13/18 08:59


 


 


  (Synthroid)  25 mcg  DAILY@0600


 PO  6/13/18 06:00


    6/13/18 05:40


 


 


  (Flomax)  0.4 mg  DAILY


 PO  6/13/18 09:00


    6/13/18 08:59


 


 


  (Protonix)  20 mg  DAILY


 PO  6/13/18 09:00


    6/13/18 08:59


 


 


  (NS Flush)  2 ml  UNSCH  PRN


 IV FLUSH  6/12/18 18:00


     


 


 


  (NS Flush)  2 ml  BID


 IV FLUSH  6/12/18 21:00


    6/13/18 08:58


 


 


  (Folate)  1 mg  DAILY


 PO  6/12/18 18:00


 6/17/18 17:59  6/13/18 08:59


 


 


  (Vitamin B1)  100 mg  DAILY


 PO  6/12/18 18:00


    6/13/18 08:59


 


 


  (Theragran M Tab)  1 tab  DAILY


 PO  6/12/18 18:00


 6/17/18 17:59  6/13/18 08:59


 


 


  (Romazicon Inj)  0.2 mg  Q1M  PRN


 IV PUSH  6/12/18 18:00


     


 


 


  (Ativan)  1 mg  Q4H  PRN


 PO  6/12/18 18:00


    6/13/18 00:43


 


 


  (Ativan Inj)  1 mg  Q4H  PRN


 IV PUSH  6/12/18 18:00


     


 


 


  (Ativan)  2 mg  Q2H  PRN


 PO  6/12/18 18:00


     


 


 


  (Ativan Inj)  2 mg  Q2H  PRN


 IV PUSH  6/12/18 18:00


     


 


 


  (Ativan Inj)  2 mg  Q1H  PRN


 IV PUSH  6/12/18 18:00


     


 


 


  (Ativan Inj)  2 mg  Q15M  PRN


 IV PUSH  6/12/18 18:00


     


 


 


  (Haldol Inj)  2 mg  Q15M  PRN


 IM  6/12/18 18:00


     


 


 


  (Heparin Inj)  5,000 units  Q8HR


 SQ  6/12/18 22:00


     


 








Family Psych History


No family psychiatric history


Social History


Patient was born and raised in [place], he lives in [place] alone, he has a 

daughter who lives in [place] and is his main support, unemployed, retired 

from the [employer], , his highest level of education is high school


Patient's Strengths (min. 2)


[facility]





Physical Exam


No tremors, no EPS, no psychomotor retardation or agitation, no stiffness


Vital Signs





Vital Signs








  Date Time  Temp Pulse Resp B/P (MAP) Pulse Ox O2 Delivery O2 Flow Rate FiO2


 


6/13/18 10:24   18     


 


6/13/18 07:22 97.7 80  123/85 (98) 97   


 


6/12/18 13:44      Room Air  














I/O   


 


 6/13/18 6/13/18 6/14/18





 08:00 16:00 00:00


 


Output Total  400 ml 


 


Balance  -400 ml 








Lab Results











Test


  6/12/18


13:42 6/12/18


15:57 6/12/18


16:30 6/12/18


19:57


 


White Blood Count 6.3 TH/MM3    


 


Red Blood Count 4.67 MIL/MM3    


 


Hemoglobin 14.4 GM/DL    


 


Hematocrit 41.7 %    


 


Mean Corpuscular Volume 89.3 FL    


 


Mean Corpuscular Hemoglobin 30.9 PG    


 


Mean Corpuscular Hemoglobin


Concent 34.6 % 


  


  


  


 


 


Red Cell Distribution Width 13.9 %    


 


Platelet Count 185 TH/MM3    


 


Mean Platelet Volume 8.8 FL    


 


Neutrophils (%) (Auto) 53.8 %    


 


Lymphocytes (%) (Auto) 37.0 %    


 


Monocytes (%) (Auto) 6.0 %    


 


Eosinophils (%) (Auto) 2.2 %    


 


Basophils (%) (Auto) 1.0 %    


 


Neutrophils # (Auto) 3.4 TH/MM3    


 


Lymphocytes # (Auto) 2.3 TH/MM3    


 


Monocytes # (Auto) 0.4 TH/MM3    


 


Eosinophils # (Auto) 0.1 TH/MM3    


 


Basophils # (Auto) 0.1 TH/MM3    


 


CBC Comment DIFF FINAL    


 


Differential Comment     


 


Blood Urea Nitrogen 12 MG/DL    


 


Creatinine 1.11 MG/DL    


 


Random Glucose 84 MG/DL    


 


Total Protein 8.3 GM/DL    


 


Albumin 4.0 GM/DL    


 


Calcium Level 8.7 MG/DL    


 


Alkaline Phosphatase 78 U/L    


 


Aspartate Amino Transf


(AST/SGOT) 33 U/L 


  


  


  


 


 


Alanine Aminotransferase


(ALT/SGPT) 34 U/L 


  


  


  


 


 


Total Bilirubin 0.7 MG/DL    


 


Sodium Level 141 MEQ/L    


 


Potassium Level 3.9 MEQ/L    


 


Chloride Level 105 MEQ/L    


 


Carbon Dioxide Level 21.8 MEQ/L    


 


Anion Gap 14 MEQ/L    


 


Estimat Glomerular Filtration


Rate 65 ML/MIN 


  


  


  


 


 


25-Hydroxy Vitamin D Total 26.3 ng/ML    


 


Free Thyroxine 1.19 NG/DL    


 


Thyroid Stimulating Hormone


3rd Gen 0.902 uIU/ML 


  


  


  


 


 


Salicylates Level


  LESS THAN 1.7


MG/DL 


  


  


 


 


Acetaminophen Level


  LESS THAN 2.0


MCG/ML 


  


  


 


 


Ethyl Alcohol Level 248 MG/DL    


 


Prothrombin Time  22.8 SEC   


 


Prothromb Time International


Ratio 


  2.3 RATIO 


  


  


 


 


Activated Partial


Thromboplast Time 


  51.5 SEC 


  


  


 


 


Magnesium Level  2.0 MG/DL   2.0 MG/DL 


 


Total Creatine Kinase  204 U/L   201 U/L 


 


Creatine Kinase MB  2.1 NG/ML   


 


Troponin I  0.04 NG/ML   0.04 NG/ML 


 


Lipase  93 U/L   


 


Urine Color   YELLOW  


 


Urine Turbidity   CLEAR  


 


Urine pH   5.5  


 


Urine Specific Gravity   1.020  


 


Urine Protein   TRACE mg/dL  


 


Urine Glucose (UA)   NEG mg/dL  


 


Urine Ketones   10 mg/dL  


 


Urine Occult Blood   NEG  


 


Urine Nitrite   NEG  


 


Urine Bilirubin   NEG  


 


Urine Urobilinogen


  


  


  LESS THAN 2.0


MG/DL 


 


 


Urine Leukocyte Esterase   NEG  


 


Urine RBC


  


  


  LESS THAN 1


/hpf 


 


 


Urine WBC


  


  


  LESS THAN 1


/hpf 


 


 


Urine Squamous Epithelial


Cells 


  


  <1 /hpf 


  


 


 


Urine Hyaline Casts   2 /lpf  


 


Urine Mucus   FEW /lpf  


 


Microscopic Urinalysis Comment


  


  


  CULT NOT


INDICATED 


 


 


Urine Opiates Screen   NEG  


 


Urine Barbiturates Screen   NEG  


 


Urine Amphetamines Screen   NEG  


 


Urine Benzodiazepines Screen   POS  


 


Urine Cocaine Screen   NEG  


 


Urine Cannabinoids Screen   NEG  


 


Test


  6/13/18


03:14 6/13/18


09:10 


  


 


 


White Blood Count 5.4 TH/MM3    


 


Red Blood Count 4.25 MIL/MM3    


 


Hemoglobin 13.0 GM/DL    


 


Hematocrit 38.1 %    


 


Mean Corpuscular Volume 89.8 FL    


 


Mean Corpuscular Hemoglobin 30.5 PG    


 


Mean Corpuscular Hemoglobin


Concent 34.0 % 


  


  


  


 


 


Red Cell Distribution Width 14.1 %    


 


Platelet Count 160 TH/MM3    


 


Mean Platelet Volume 8.9 FL    


 


Neutrophils (%) (Auto) 57.2 %    


 


Lymphocytes (%) (Auto) 27.6 %    


 


Monocytes (%) (Auto) 12.1 %    


 


Eosinophils (%) (Auto) 1.9 %    


 


Basophils (%) (Auto) 1.2 %    


 


Neutrophils # (Auto) 3.1 TH/MM3    


 


Lymphocytes # (Auto) 1.5 TH/MM3    


 


Monocytes # (Auto) 0.7 TH/MM3    


 


Eosinophils # (Auto) 0.1 TH/MM3    


 


Basophils # (Auto) 0.1 TH/MM3    


 


CBC Comment DIFF FINAL    


 


Differential Comment     


 


Blood Urea Nitrogen 11 MG/DL    


 


Creatinine 0.95 MG/DL    


 


Random Glucose 104 MG/DL    


 


Total Protein 7.4 GM/DL    


 


Albumin 3.4 GM/DL    


 


Calcium Level 8.1 MG/DL    


 


Phosphorus Level 2.9 MG/DL  2.7 MG/DL   


 


Magnesium Level 1.8 MG/DL  1.8 MG/DL   


 


Alkaline Phosphatase 71 U/L    


 


Aspartate Amino Transf


(AST/SGOT) 25 U/L 


  


  


  


 


 


Alanine Aminotransferase


(ALT/SGPT) 31 U/L 


  


  


  


 


 


Total Bilirubin 1.0 MG/DL    


 


Sodium Level 141 MEQ/L    


 


Potassium Level 4.1 MEQ/L    


 


Chloride Level 102 MEQ/L    


 


Carbon Dioxide Level 27.4 MEQ/L    


 


Anion Gap 12 MEQ/L    


 


Estimat Glomerular Filtration


Rate 77 ML/MIN 


  


  


  


 


 


Total Creatine Kinase 214 U/L  198 U/L   


 


Troponin I 0.05 NG/ML  0.05 NG/ML   


 


Triglycerides Level 69 MG/DL    


 


Cholesterol Level 176 MG/DL    


 


LDL Cholesterol 111 MG/DL    


 


HDL Cholesterol 51.3 MG/DL    


 


Cholesterol/HDL Ratio 3.43 RATIO    


 


Free Thyroxine 0.94 NG/DL    


 


Thyroid Stimulating Hormone


3rd Gen 2.020 uIU/ML 


  


  


  


 











Mental Status Examination


Appearance:  Appropriate


Consciousness:  Alert


Orientation:  x4


Motor Activity:  Normal gait


Speech:  Unremarkable


Language:  Adequate


Fund of Knowledge:  Adequate


Attention and Concentration:  Adequate


Memory:  Unremarkable


Mood:  Appropriate


Affect:  Appropriate


Thought Process & Associations:  Intact


Thought Content:  Appropriate


Hallucination Type:  None


Delusion Type:  None


Suicidal Ideation:  No


Suicidal Plan:  No


Suicidal Intention:  No


Homicidal Ideation:  No


Homicidal Plan:  No


Homicidal Intention:  No


Insight:  Adequate


Judgment:  Adequate





Assessment & Plan


Problem List:  


(1) Alcohol abuse with alcohol-induced mood disorder


ICD Codes:  F10.14 - Alcohol abuse with alcohol-induced mood disorder


Assessment & Plan:  On the psychiatric evaluation the patient does not present 

any acute, concerning or significant neuropsychiatric symptoms that require an 

immediate psychiatric intervention.  He denies symptoms of depression, anxiety, 

jessica and psychosis.  He denies suicidal enemas ideation, he denies visual and 

auditory hallucinations.  Patient seems to be in precontemplation state of his 

alcohol use, minimizes and denies his alcohol use.  No psychiatric admission 

indicated.  Brief supportive psychotherapy provided. place in [facility] protocol.  

Davis act will be lifted.





Assessment & Plan


Estimated LOS:  Caleb Lopez MD Jun 13, 2018 12:04

## 2018-06-13 NOTE — MB
cc:

Scott Patricia MD

****

 

 

DATE:

06/13/2018

 

INDICATION:

Chest pain.

 

HISTORY OF PRESENT ILLNESS:

This 73-year-old gentleman has a history of hypertension, 

hyperlipidemia and atrial fibrillation.  [facility]  He is on anticoagulation therapy.  Denies any prior history

of angina, heart catheterization, percutaneous intervention.  The 

patient had some rather atypical type symptoms.  He is not a great 

historian.  He says that he has chest pain on and off pretty much most

of the time.  His symptoms, this particular event, were not 

exertional.  [facility], had an appointment, reports 

having 2-3 shots of hard liquor prior to his appointment.  [facility] 

re[facility] to come [facility] here as initial troponin was 0.04.  

Electrocardiogram is unremarkable.  He is currently chest pain free.

 

PAST MEDICAL HISTORY:

Hypertension, hyperlipidemia, congestive heart failure, atrial 

fibrillation.

 

REPORTED MEDICATIONS:

1.  Aspirin.

2.  Omeprazole.

3.  Levothyroxine.

4.  Isosorbide.

5.  Neurontin.

6.  Lasix.

7.  Coreg.

8.  Lipitor.

9.  Norvasc.

 

ALLERGIES:

ALLOPURINOL AND NAPROXEN.

 

FAMILY HISTORY:

Denies any family history of early coronary disease or sudden cardiac 

death.

 

SOCIAL HISTORY:

Does report occasional alcohol use.  Denies any tobacco or drug use.

 

REVIEW OF SYSTEMS:

A 12-point review of system was performed and is negative unless 

otherwise as noted in the history of present illness.

 

PHYSICAL EXAMINATION:

VITAL SIGNS:  Temperature is 97, pulse 80, blood pressure 123/85 mmHg.

GENERAL:  Alert and oriented x 3 in no acute distress.

HEENT:  Shows pupils reactive to light and accommodation.  Extraocular

movements are intact.

NECK:  No elevation of jugular venous distention.  No thyromegaly or 

lymphadenopathy.  No carotid bruits.

LUNGS:  Clear to auscultation bilaterally.

CARDIOVASCULAR:  Regular rate and rhythm without murmurs, rubs or 

gallops.

ABDOMEN:  Nontender, nondistended.  Good bowel sounds.  No 

hepatosplenomegaly.

EXTREMITIES:  He show cyanosis, clubbing or edema.  Good peripheral 

pulses.

NEUROLOGIC:  Cranial nerves intact.  Motor and sensory grossly intact.

 

LABORATORY DATA:

WBC 5.4, hemoglobin is 13, platelet count is 160.  INR is 2.3.

 

Sodium 141, potassium 4.1, BUN is 11, creatinine 0.95.  Troponin 0.04,

0.04, 0.5.

 

Electrocardiogram shows a normal sinus rhythm, premature atrial 

complex, premature ventricular complex.

 

ASSESSMENT:

1.  Atypical chest pain.

2.  Borderline troponin.

3.  Atrial fibrillation and congestive heart failure.

4.  Hypertension.

5.  Hyperlipidemia.

 

PLAN:

Patient's symptoms are very atypical.  He states that these are 

unchanged for some time.  Electrocardiogram is unremarkable.  

Troponin, although mildly elevated, may just be his baseline if he has

underlying cardiomyopathy.  The patient was offered stress test and 

denied.  The patient's INR is 2.3 and there is no indication for 

invasive strategy.  He is on long-acting nitrates.  At this point, we 

will proceed with a conservative approach.  He can followup with the 

[facility].  We will sign off.  Call with any questions.

 

 

__________________________________

Scott Patricia MD

 

 

AMELIA/DL

D: 06/13/2018, 07:47 AM

T: 06/13/2018, 08:19 AM

Visit #: [id number]

Job #: [id number]

## 2018-06-13 NOTE — HHI.PR
Subjective


Remarks


Patient walking in room.  Appears comfortable.  Denies any chest pain shortness 

of breath.  Says he feels like going home.  He says he drank about 4 beers 

yesterday in a bar prior to his doctor's appointment because he thought it 

would take the edge off.  He reports history of alcohol use, however quit 10 

years ago.  Does not keep alcohol at home.  He says he will no longer drink.  

He denies any history of alcohol withdrawal.





Objective





Vital Signs








  Date Time  Temp Pulse Resp B/P (MAP) Pulse Ox O2 Delivery O2 Flow Rate FiO2


 


6/13/18 10:24   18     


 


6/13/18 07:22 97.7 80 18 123/85 (98) 97   


 


6/13/18 04:37 97.9 77 17 132/62 (85) 95   


 


6/12/18 23:52 98.2 73 17 117/59 (78) 98   


 


6/12/18 20:16 98.0 92 19 159/68 (98) 96   


 


6/12/18 13:44     96 Room Air  


 


6/12/18 13:07 98.3 80 20 146/76 (99) 96 Room Air  


 


6/12/18 12:55 98.3 80 20 146/76 (99) 96   














I/O      


 


 6/12/18 6/12/18 6/12/18 6/13/18 6/13/18 6/13/18





 07:00 15:00 23:00 07:00 15:00 23:00


 


Output Total     400 ml 


 


Balance     -400 ml 


 


      


 


Output Urine Total     400 ml 








Result Diagram:  


6/13/18 0314                                                                   

             6/13/18 0314





Objective Remarks


GENERAL: Patient walking in room.  Appears comfortable.  Alert and oriented 3.


SKIN: Warm and dry.


HEAD: Normocephalic.


EYES: No scleral icterus. No injection or drainage. 


NECK: Supple, trachea midline. No JVD.


CARDIOVASCULAR: Regular rate and rhythm without murmurs, gallops, or rubs. 


RESPIRATORY: Breath sounds equal bilaterally. No accessory muscle use.


GASTROINTESTINAL: Abdomen soft, non-tender, nondistended. 


MUSCULOSKELETAL: No cyanosis, or edema. 


BACK: Nontender without obvious deformity. No CVA tenderness.








A/P


Assessment and Plan


//Chest pain


EKG significant for atrial fibrillation, pulse 74, without ST segment 

elevations or depressions


Initial troponin 0.04


ACS rule out pending; serial troponins/EKGs


Aspirin


Morphine


= Cardiology signed off.  Appreciate assistance.  Follow-up with primary care 

as outpatient.





//Alcohol intoxication


Patient placed under [place] act


Psychiatry consulted, appreciate recommendations


Thiamine/folate/multivitamins


= Patient says he has a history of a alcohol use in the past, however quit 10 

years ago.  He says this episode of drinking yesterday was a one-time thing.  

He denies any withdrawal.  Appreciate case management assistance.  Patient says 

he will avoid all alcohol.





//Atrial fibrillation/CHF


Continue home medications


The patient reports he had adverse reactions to anticoagulants therefore he is 

only anticoagulated on aspirin





// Hypertension/hyperlipidemia/CAD/hypothyroidism


Continue home medication





FEN


N.p.o.


Electrolytes: Monitor and replete as needed


Heparin


Discharge Planning


Discharge home in good condition.  Continue heart healthy diet.  Activity ad 

mary.  Please see discharge medication reconciliation for medication list.  

Follow-up with primary care as outpatient.











Antonio Phillips MD Jun 13, 2018 11:24

## 2018-06-13 NOTE — EKG
Date Performed: 06/12/2018       Time Performed: 23:46:12

 

PTAGE:      73 years

 

EKG:      ATRIAL FIBRILLATION WITH ABERRANT CONDUCTION OR VENTRICULAR PREMATURE COMPLEXES LOW QRS VOL
TAGE IN PRECORDIAL LEADS ABNORMAL RHYTHM ECG

 

PREVIOUS TRACING       : 06/12/2018 15.39

 

DOCTOR:   Scott Patricia  Interpretating Date/Time  06/13/2018 09:03:49

## 2018-06-13 NOTE — EKG
Date Performed: 06/13/2018       Time Performed: 04:33:36

 

PTAGE:      73 years

 

EKG:      ATRIAL FIBRILLATION PVC ABNORMAL RHYTHM ECG

 

PREVIOUS TRACING       : 06/12/2018 23.46

 

DOCTOR:   Scott Patricia  Interpretating Date/Time  06/13/2018 09:00:30